# Patient Record
Sex: FEMALE | Race: WHITE | NOT HISPANIC OR LATINO
[De-identification: names, ages, dates, MRNs, and addresses within clinical notes are randomized per-mention and may not be internally consistent; named-entity substitution may affect disease eponyms.]

---

## 2023-08-18 PROBLEM — Z00.00 ENCOUNTER FOR PREVENTIVE HEALTH EXAMINATION: Status: ACTIVE | Noted: 2023-08-18

## 2023-08-21 ENCOUNTER — APPOINTMENT (OUTPATIENT)
Dept: CARDIOLOGY | Facility: CLINIC | Age: 73
End: 2023-08-21
Payer: MEDICAID

## 2023-08-21 VITALS
DIASTOLIC BLOOD PRESSURE: 60 MMHG | HEIGHT: 62.99 IN | BODY MASS INDEX: 24.1 KG/M2 | SYSTOLIC BLOOD PRESSURE: 110 MMHG | WEIGHT: 136 LBS

## 2023-08-21 DIAGNOSIS — Z82.49 FAMILY HISTORY OF ISCHEMIC HEART DISEASE AND OTHER DISEASES OF THE CIRCULATORY SYSTEM: ICD-10-CM

## 2023-08-21 DIAGNOSIS — Z78.9 OTHER SPECIFIED HEALTH STATUS: ICD-10-CM

## 2023-08-21 DIAGNOSIS — R42 DIZZINESS AND GIDDINESS: ICD-10-CM

## 2023-08-21 DIAGNOSIS — R93.1 ABNORMAL FINDINGS ON DIAGNOSTIC IMAGING OF HEART AND CORONARY CIRCULATION: ICD-10-CM

## 2023-08-21 PROCEDURE — 93000 ELECTROCARDIOGRAM COMPLETE: CPT

## 2023-08-21 PROCEDURE — 99204 OFFICE O/P NEW MOD 45 MIN: CPT | Mod: 25

## 2023-08-21 RX ORDER — ROSUVASTATIN CALCIUM 5 MG/1
5 TABLET, FILM COATED ORAL
Refills: 0 | Status: ACTIVE | COMMUNITY

## 2023-08-21 RX ORDER — ASPIRIN 81 MG
81 TABLET, DELAYED RELEASE (ENTERIC COATED) ORAL
Refills: 0 | Status: ACTIVE | COMMUNITY

## 2023-08-21 NOTE — ASSESSMENT
[FreeTextEntry1] : 73-yo with HTN and hyperlipidemia, DM Episodes of chest pain atypical for angina. ? abnormal ECHO in 2022 (? "fluid in the lungs"). Unexplained weight loss, w/u in progress.  Plan: Reduce Atenolol to 12.5 mg daily. Continue Lisinopril. Reduce Rosuvastatin to 5 mg daily. 2D ECHO. F/u after ECHO.  Denis Espinal MD

## 2023-08-21 NOTE — REVIEW OF SYSTEMS
[Chest Discomfort] : chest discomfort [Joint Pain] : joint pain [Dizziness] : dizziness [Negative] : Genitourinary [Blurry Vision] : no blurred vision [Dyspnea on exertion] : not dyspnea during exertion [Lower Ext Edema] : no extremity edema [Leg Claudication] : no intermittent leg claudication [Palpitations] : no palpitations

## 2023-08-21 NOTE — HISTORY OF PRESENT ILLNESS
[FreeTextEntry1] : 73 y.o. female with PMH of DM, HTN, hyperlipidemia presents to establish care. Patient had an episode of chest pain in 12/2022, had TTE and EKG done (as per patient TTE was abnormal, results are not available). No recurrent episodes of chest pain. She complains of low BP (98/51), lightheadedness, presyncope over the last 8 months. Patient lost 12 kg over the last 6 months, follows with her PCP.  Denies SOB, palpitations.   TC 90, LDL 25, HDL 30, trig 175.

## 2023-08-21 NOTE — PHYSICAL EXAM
[Well Developed] : well developed [Well Nourished] : well nourished [No Acute Distress] : no acute distress [Normal Venous Pressure] : normal venous pressure [Normal S1, S2] : normal S1, S2 [No Murmur] : no murmur [Clear Lung Fields] : clear lung fields [Edema ___] : edema [unfilled] [Normal] : moves all extremities, no focal deficits, normal speech [Alert and Oriented] : alert and oriented [S4] : S4

## 2023-08-29 ENCOUNTER — APPOINTMENT (OUTPATIENT)
Dept: CARDIOLOGY | Facility: CLINIC | Age: 73
End: 2023-08-29
Payer: MEDICAID

## 2023-08-29 PROCEDURE — 93306 TTE W/DOPPLER COMPLETE: CPT

## 2023-09-07 ENCOUNTER — APPOINTMENT (OUTPATIENT)
Dept: CARDIOLOGY | Facility: CLINIC | Age: 73
End: 2023-09-07
Payer: MEDICAID

## 2023-09-07 VITALS
DIASTOLIC BLOOD PRESSURE: 60 MMHG | HEIGHT: 62 IN | WEIGHT: 131 LBS | SYSTOLIC BLOOD PRESSURE: 102 MMHG | BODY MASS INDEX: 24.11 KG/M2

## 2023-09-07 DIAGNOSIS — E78.5 HYPERLIPIDEMIA, UNSPECIFIED: ICD-10-CM

## 2023-09-07 DIAGNOSIS — I10 ESSENTIAL (PRIMARY) HYPERTENSION: ICD-10-CM

## 2023-09-07 DIAGNOSIS — R07.89 OTHER CHEST PAIN: ICD-10-CM

## 2023-09-07 PROCEDURE — 93000 ELECTROCARDIOGRAM COMPLETE: CPT

## 2023-09-07 PROCEDURE — 99214 OFFICE O/P EST MOD 30 MIN: CPT | Mod: 25

## 2023-09-07 RX ORDER — METOPROLOL SUCCINATE 25 MG/1
25 TABLET, EXTENDED RELEASE ORAL DAILY
Qty: 45 | Refills: 1 | Status: ACTIVE | COMMUNITY
Start: 2023-09-07 | End: 1900-01-01

## 2023-09-07 RX ORDER — METFORMIN HYDROCHLORIDE 500 MG/1
500 TABLET, COATED ORAL
Refills: 0 | Status: ACTIVE | COMMUNITY

## 2023-09-07 RX ORDER — ATENOLOL 25 MG/1
25 TABLET ORAL DAILY
Refills: 0 | Status: DISCONTINUED | COMMUNITY
End: 2023-09-07

## 2023-09-07 NOTE — HISTORY OF PRESENT ILLNESS
[FreeTextEntry1] : 73 y.o. female with PMH of DM, HTN, hyperlipidemia presents to establish care. Patient had an episode of chest pain in 12/2022, had TTE and EKG done (as per patient TTE was abnormal, results are not available). No recurrent episodes of chest pain. She complains of low BP (98/51), lightheadedness, presyncope over the last 8 months. Patient lost 12 kg over the last 6 months, follows with her PCP.  Denies SOB, palpitations.   TC 90, LDL 25, HDL 30, trig 175.   9/23 Echo - Normal EF, mild TR, no pericardial effusion.

## 2023-09-07 NOTE — ASSESSMENT
[FreeTextEntry1] : 73-yo with HTN and hyperlipidemia, DM Episodes of chest pain atypical for angina, now resolved. Normal LVEF and normal diastolic function. No pericardial effusion.  Unexplained weight loss, w/u in progress.  Plan: Replace Atenolol with Metoprolol 12.5 mg daily. Reduce Lisinopril to 2.5 mg daily. Rosuvastatin reduced to 5 mg daily. F/u in 4 months.  Denis Espinal MD

## 2023-09-07 NOTE — PHYSICAL EXAM
[Well Developed] : well developed [Well Nourished] : well nourished [No Acute Distress] : no acute distress [Normal Venous Pressure] : normal venous pressure [Normal S1, S2] : normal S1, S2 [No Murmur] : no murmur [S4] : S4 [Clear Lung Fields] : clear lung fields [Edema ___] : edema [unfilled] [Normal] : moves all extremities, no focal deficits, normal speech [Alert and Oriented] : alert and oriented

## 2023-10-24 ENCOUNTER — APPOINTMENT (OUTPATIENT)
Dept: ORTHOPEDIC SURGERY | Facility: CLINIC | Age: 73
End: 2023-10-24

## 2024-01-18 ENCOUNTER — APPOINTMENT (OUTPATIENT)
Dept: CARDIOLOGY | Facility: CLINIC | Age: 74
End: 2024-01-18

## 2024-02-12 ENCOUNTER — APPOINTMENT (OUTPATIENT)
Dept: RHEUMATOLOGY | Facility: CLINIC | Age: 74
End: 2024-02-12
Payer: MEDICAID

## 2024-02-12 VITALS
TEMPERATURE: 98 F | DIASTOLIC BLOOD PRESSURE: 84 MMHG | HEIGHT: 62.99 IN | OXYGEN SATURATION: 95 % | WEIGHT: 132 LBS | HEART RATE: 97 BPM | BODY MASS INDEX: 23.39 KG/M2 | SYSTOLIC BLOOD PRESSURE: 140 MMHG

## 2024-02-12 DIAGNOSIS — M79.642 PAIN IN RIGHT HAND: ICD-10-CM

## 2024-02-12 DIAGNOSIS — M06.041 RHEUMATOID ARTHRITIS W/OUT RHEUMATOID FACTOR, RIGHT HAND: ICD-10-CM

## 2024-02-12 DIAGNOSIS — I73.00 RAYNAUD'S SYNDROME W/OUT GANGRENE: ICD-10-CM

## 2024-02-12 DIAGNOSIS — M06.042 RHEUMATOID ARTHRITIS W/OUT RHEUMATOID FACTOR, RIGHT HAND: ICD-10-CM

## 2024-02-12 DIAGNOSIS — M79.641 PAIN IN RIGHT HAND: ICD-10-CM

## 2024-02-12 PROCEDURE — 99204 OFFICE O/P NEW MOD 45 MIN: CPT

## 2024-02-12 RX ORDER — CELECOXIB 200 MG/1
200 CAPSULE ORAL
Qty: 30 | Refills: 1 | Status: ACTIVE | COMMUNITY
Start: 2024-02-12 | End: 1900-01-01

## 2024-02-12 RX ORDER — PANTOPRAZOLE 40 MG/1
40 TABLET, DELAYED RELEASE ORAL
Refills: 0 | Status: ACTIVE | COMMUNITY

## 2024-02-12 RX ORDER — LOPERAMIDE HYDROCHLORIDE 2 MG/1
2 CAPSULE ORAL
Refills: 0 | Status: ACTIVE | COMMUNITY

## 2024-02-12 NOTE — REASON FOR VISIT
[Initial Evaluation] : an initial evaluation [FreeTextEntry1] : b/l hand and face swelling, weight loss, previously diagnosed with RA

## 2024-02-12 NOTE — ASSESSMENT
[FreeTextEntry1] : Joint pain, hand and face swelling: Pt's symptoms are suspicious for possible systemic sclerosis in addition to inflammatory arthritis (which is rarely a manifestation of systemic sclerosis). She had previous labs with negative Scl70 but no other systemic sclerosis labs were performed; also had negative RF but no CCP available. It is possible that she may have an overlap syndrome. With no improvement on methotrexate 15 mg x 4 months.  - Continue methotrexate 15 mg q week for now - Continue folic acid 1 mg q day for now - Start Celebrex 200 mg q day for now - Continue pantoprazole - f/u additional labs for systemic connective tissue diseases, also f/u quantiferon and hep B titers  f/u in 3 weeks to discuss lab results and next steps

## 2024-02-12 NOTE — HISTORY OF PRESENT ILLNESS
[FreeTextEntry1] : From January to August 2023, pt lost 12 kg, and developed severe diffuse pain where she couldn't walk. She went to [facility] and was diagnosed with rheumatoid arthritis. She was prescribed prednisone 20 mg -> 10 mg 1.5 week taper, and was then told to discontinue the prednisone because it can affect her liver? She was then started on methotrexate 15 mg q week, but her symptoms came back on methotrexate off of prednisone. She switched away from Dr. Voss due to insurance change. + Severe gastritis. She has also noticed diffuse finger swelling and swelling in her face, and her fingers turn black with cold. Pt denies dysphagia or rashes.   Physical exam: GEN: Uncomfortable-appearing woman sitting on exam table  FACE: + Periorbital edema PULM: Clear to auscultation b/l CV: Regular rate and rhythm, no murmurs MSK: Neck: Full ROM, + pain with ROM Shoulders: Limited ROM to approx 90 degrees b/l Elbows: + Pain with ROM b/l Wrists: + Effusions with pain on ROM b/l Hands: + Pronounced diffuse edema in all fingers distal to MCPs Hips: Full ROM b/l Knees: + Pain with ROM b/l Ankles: no effusions, full ROM b/l Feet: no effusions, no TTP EXT: + Dilated nailfold capillaries

## 2024-03-19 ENCOUNTER — APPOINTMENT (OUTPATIENT)
Dept: RHEUMATOLOGY | Facility: CLINIC | Age: 74
End: 2024-03-19
Payer: MEDICAID

## 2024-03-19 VITALS
OXYGEN SATURATION: 96 % | DIASTOLIC BLOOD PRESSURE: 72 MMHG | SYSTOLIC BLOOD PRESSURE: 116 MMHG | BODY MASS INDEX: 24.32 KG/M2 | HEIGHT: 62.99 IN | HEART RATE: 80 BPM | WEIGHT: 137.28 LBS

## 2024-03-19 PROCEDURE — 99215 OFFICE O/P EST HI 40 MIN: CPT

## 2024-03-19 NOTE — ASSESSMENT
[FreeTextEntry1] : Systemic sclerosis: With Raynaud's, skin tightening and puffiness noted on hand exam and perioral skin tightening, also dilated nailfold capillaries and GERD symptoms. Pt's labs came back negative for RA, for which she was recently being treated with methotrexate with no improvement in her symptoms. High +RNA hannah III, which is a/w increased risk of scleroderma renal crisis. BP today is 110s/70s. - Discontinue lisinopril as it can increase risk of scleroderma renal crisis in patients with no history of it - Start nifedipine 30 mg q day instead as this can help with Raynaud's symptoms - Start mycophenolate mofetil 500 mg BID, discussed adverse effects - Referred pt for chest CT to evaluate for ILD  Back and knee pain: Pt's symptoms are most suggestive of degenerative changes. She says she previously had imaging of her spine and knees also demonstrating degenerative changes. - Offered pt referral to PT but she declined as she would rather do her own exercises - start duloxetine 30 mg q day, discussed adverse effects  f/u in 6 weeks

## 2024-03-19 NOTE — HISTORY OF PRESENT ILLNESS
[FreeTextEntry1] : Pt comes back today to review her lab results. She continues to experience pain in her back and knees. Also + stiffness in the hands with inability to straighten out her fingers, chronically cold hands and feet, and Raynaud's symptoms with numbness in the hands. + Dyspepsia, pt had EGD which demonstrated gastritis.  Previous testing: - TTE in 2024 - reportedly unremarkable, pt will bring results. Pt had TTE for palpitations - EGD - reportedly gastritis  Previous HPI: From January to August 2023, pt lost 12 kg, and developed severe diffuse pain where she couldn't walk. She went to [facility] and was diagnosed with rheumatoid arthritis. She was prescribed prednisone 20 mg -> 10 mg 1.5 week taper, and was then told to discontinue the prednisone because it can affect her liver? She was then started on methotrexate 15 mg q week, but her symptoms came back on methotrexate off of prednisone. She switched away from Dr. [facility]oss due to insurance change. + Severe gastritis. She has also noticed diffuse finger swelling and swelling in her face, and her fingers turn black with cold. Pt denies dysphagia or rashes.   Physical exam: GEN: Uncomfortable-appearing woman sitting on exam table  FACE: + Periorbital edema PULM: Clear to auscultation b/l CV: Regular rate and rhythm, no murmurs MOUTH: Reduced oral aperture, moist mucous membranes, no oral ulcers MSK: Neck: Full ROM, + pain with ROM Shoulders: Limited ROM to approx 90 degrees b/l Elbows: + Pain with ROM b/l Wrists: + Effusions with pain on ROM b/l Hands: + Pronounced diffuse edema in all fingers distal to MCPs Hips: Full ROM b/l Knees: + Pain with ROM b/l Ankles: no effusions, full ROM b/l Feet: no effusions, no TTP EXT: + Dilated nailfold capillaries

## 2024-04-23 ENCOUNTER — NON-APPOINTMENT (OUTPATIENT)
Age: 74
End: 2024-04-23

## 2024-05-16 ENCOUNTER — APPOINTMENT (OUTPATIENT)
Dept: RHEUMATOLOGY | Facility: CLINIC | Age: 74
End: 2024-05-16
Payer: MEDICAID

## 2024-05-16 VITALS
OXYGEN SATURATION: 88 % | TEMPERATURE: 97.9 F | HEIGHT: 62 IN | SYSTOLIC BLOOD PRESSURE: 114 MMHG | DIASTOLIC BLOOD PRESSURE: 71 MMHG | BODY MASS INDEX: 25.4 KG/M2 | WEIGHT: 138 LBS | HEART RATE: 146 BPM

## 2024-05-16 DIAGNOSIS — Z86.39 PERSONAL HISTORY OF OTHER ENDOCRINE, NUTRITIONAL AND METABOLIC DISEASE: ICD-10-CM

## 2024-05-16 DIAGNOSIS — Z87.19 PERSONAL HISTORY OF OTHER DISEASES OF THE DIGESTIVE SYSTEM: ICD-10-CM

## 2024-05-16 DIAGNOSIS — Z87.39 PERSONAL HISTORY OF OTHER DISEASES OF THE MUSCULOSKELETAL SYSTEM AND CONNECTIVE TISSUE: ICD-10-CM

## 2024-05-16 DIAGNOSIS — Z86.79 PERSONAL HISTORY OF OTHER DISEASES OF THE CIRCULATORY SYSTEM: ICD-10-CM

## 2024-05-16 PROCEDURE — 99215 OFFICE O/P EST HI 40 MIN: CPT

## 2024-05-16 RX ORDER — METHOTREXATE 2.5 MG/1
2.5 TABLET ORAL
Qty: 73 | Refills: 1 | Status: DISCONTINUED | COMMUNITY
Start: 2024-02-12 | End: 2024-05-16

## 2024-05-16 RX ORDER — LISINOPRIL 2.5 MG/1
2.5 TABLET ORAL
Qty: 90 | Refills: 1 | Status: DISCONTINUED | COMMUNITY
End: 2024-05-16

## 2024-05-16 RX ORDER — ARTIFICIAL TEARS 1; 2; 3 MG/ML; MG/ML; MG/ML
SOLUTION/ DROPS OPHTHALMIC
Refills: 0 | Status: ACTIVE | COMMUNITY

## 2024-05-16 RX ORDER — NIFEDIPINE 30 MG/1
30 TABLET, EXTENDED RELEASE ORAL
Qty: 30 | Refills: 3 | Status: DISCONTINUED | COMMUNITY
Start: 2024-03-19 | End: 2024-05-16

## 2024-05-16 RX ORDER — GABAPENTIN 100 MG/1
100 CAPSULE ORAL
Qty: 30 | Refills: 2 | Status: ACTIVE | COMMUNITY
Start: 2024-05-16 | End: 1900-01-01

## 2024-05-16 NOTE — HISTORY OF PRESENT ILLNESS
[FreeTextEntry1] : After her last visit, pt tried taking duloxetine but developed severe nausea so she had to discontinue it. She also tried taking nifedipine for the Raynaud's but she developed an allergic reaction, where her face became red and swollen, so she had to stop taking it. She has been taking the mycophenolate mofetil without any side effects. + Continued severe pain in the wrists, legs, with pain in the back and legs with walking. + Also SOB with walking. Pt denies choking when she eats.  Previous testing: - TTE in 8/2023 with borderline pulmonary hypertension PASP 29, no pericardial effusion, EF 61% - EGD - reportedly gastritis, GI doctor no longer takes pt's insurance  Previous HPI: From January to August 2023, pt lost 12 kg, and developed severe diffuse pain where she couldn't walk. She went to [facility] and was diagnosed with rheumatoid arthritis. She was prescribed prednisone 20 mg -> 10 mg 1.5 week taper, and was then told to discontinue the prednisone because it can affect her liver? She was then started on methotrexate 15 mg q week, but her symptoms came back on methotrexate off of prednisone. She switched away from Dr. Voss due to insurance change. + Severe gastritis. She has also noticed diffuse finger swelling and swelling in her face, and her fingers turn black with cold. Pt denies dysphagia or rashes.   Physical exam: GEN: Uncomfortable-appearing woman sitting on exam table  FACE: + Periorbital edema PULM: Clear to auscultation b/l CV: Regular rate and rhythm, no murmurs MOUTH: Reduced oral aperture, moist mucous membranes, no oral ulcers MSK: Neck: Full ROM, + pain with ROM Shoulders: Limited ROM to approx 90 degrees b/l Elbows: + Pain with ROM b/l Wrists: + Effusions with pain on ROM b/l Hands: + Pronounced diffuse edema in all fingers distal to MCPs with limited extension at PIPs Hips: Full ROM b/l Knees: + Pain with ROM b/l Ankles: no effusions, full ROM b/l Feet: no effusions, no TTP EXT: + Dilated nailfold capillaries

## 2024-05-16 NOTE — ASSESSMENT
[FreeTextEntry1] : Abnormal CT chest: Pt had a CT chest to evaluate for ILD in April 2024, which demonstrated bronchiectasis with findings concerning for possible recurrent aspiration, likely related to her systemic sclerosis. Also with multiple pulmonary nodules. The CT scan was indeterminate for ILD. - Referred to pulmonology clinic for follow-up for PFTs and further evaluation of findings concerning for aspiration  - Referred to GI clinic for evaluation for esophageal dysmotility given findings on CT chest - Pt is due for repeat CT chest ~July-August 2024    Systemic sclerosis: With Raynaud's, skin tightening and puffiness noted on hand exam and perioral skin tightening, dilated nailfold capillaries, GERD symptoms, also likely esophageal dysmotility given the CT chest findings mentioned above. Pt's labs came back negative for RA, for which she was recently being treated with methotrexate with no improvement in her symptoms. High +RNA hannah III, which is a/w increased risk of scleroderma renal crisis. BP today is stable at 114/71. - Discontinue lisinopril again, as it can increase risk of scleroderma renal crisis in patients with no history of it - Start amlodipine 5 mg q day - f/u CBC and CMP. If CBC and CMP are wnl, will plan to increase mycophenolate mofetil to 1000 mg qAM and 500 mg q PM - Referred to pulm and GI clinic as above  Back and knee pain: Pt's symptoms are most suggestive of degenerative changes. She says she previously had imaging of her spine and knees also demonstrating degenerative changes. Pt was unable to tolerate duloxetine due to GI side effects - Previously offered pt referral to PT but she declined as she would rather do her own exercises - Start gabapentin 100 mg qhs  f/u in 6 weeks, will call pt with lab results

## 2024-05-17 ENCOUNTER — APPOINTMENT (OUTPATIENT)
Dept: PULMONOLOGY | Facility: CLINIC | Age: 74
End: 2024-05-17
Payer: MEDICAID

## 2024-05-17 ENCOUNTER — OUTPATIENT (OUTPATIENT)
Dept: OUTPATIENT SERVICES | Facility: HOSPITAL | Age: 74
LOS: 1 days | End: 2024-05-17
Payer: MEDICAID

## 2024-05-17 VITALS
DIASTOLIC BLOOD PRESSURE: 76 MMHG | HEART RATE: 78 BPM | TEMPERATURE: 97.6 F | OXYGEN SATURATION: 98 % | HEIGHT: 62 IN | SYSTOLIC BLOOD PRESSURE: 120 MMHG | BODY MASS INDEX: 25.58 KG/M2 | WEIGHT: 139 LBS

## 2024-05-17 DIAGNOSIS — J84.9 INTERSTITIAL PULMONARY DISEASE, UNSPECIFIED: ICD-10-CM

## 2024-05-17 DIAGNOSIS — Z00.00 ENCOUNTER FOR GENERAL ADULT MEDICAL EXAMINATION WITHOUT ABNORMAL FINDINGS: ICD-10-CM

## 2024-05-17 DIAGNOSIS — R06.02 SHORTNESS OF BREATH: ICD-10-CM

## 2024-05-17 PROCEDURE — 99204 OFFICE O/P NEW MOD 45 MIN: CPT

## 2024-05-17 NOTE — PHYSICAL EXAM
[No Acute Distress] : no acute distress [Normal Oropharynx] : normal oropharynx [Normal Appearance] : normal appearance [Normal Rate/Rhythm] : normal rate/rhythm [Normal S1, S2] : normal s1, s2 [No Resp Distress] : no resp distress [No Abnormalities] : no abnormalities [Benign] : benign [Normal Gait] : normal gait [No Edema] : no edema [No Focal Deficits] : no focal deficits [TextBox_105] : swollen fingers and thight skin

## 2024-05-17 NOTE — HISTORY OF PRESENT ILLNESS
[Never] : never [TextBox_4] : This is the case of a 75 yo female patient newly diagnosed with systemic sclerosis  started of MMF presenting for pulmonary evaluation .  Patient reports shortness of breath on minimal exertion . Started recently on MMF .

## 2024-05-17 NOTE — ASSESSMENT
[FreeTextEntry1] : This is the case of a 73 yo female patient newly diagnosed with systemic sclerosis  started of MMF presenting for pulmonary evaluation .  Patient reports shortness of breath on minimal exertion . Started recently on MMF .    # dyspnea -CT scna reviewed  -no honeycoombing  -Found multiple  pulmonary nodules  -Will send for cardiology and echo cardio for further evaluation of dyspnea  -PFT ordered  #latent TB  Quantiferon positive  will send bher on isoniazid sent to pharmacy  CMP and hepatic function panel in 2 weeks  advised to come back in one month    #multiple nodules on CT  Will send for PET    RTC in one month

## 2024-05-17 NOTE — END OF VISIT
[] : Resident [FreeTextEntry3] : Lung nodules - multiple - peripheral - PET ordered  PFTs  CT reviewed - no clear ILD  Usually NSIP is associated with SS  Will also repeat Echo to rule out pulm htn  O2 sat normal with ambulation  Quantiferon positive  Start INH - LFTs normal previously  Labs every month and then symptom driven  1 month follow up

## 2024-05-17 NOTE — REVIEW OF SYSTEMS
[Fatigue] : fatigue [Recent Wt Loss (___ Lbs)] : ~T recent [unfilled] lb weight loss [Negative] : Neurologic

## 2024-05-22 ENCOUNTER — NON-APPOINTMENT (OUTPATIENT)
Age: 74
End: 2024-05-22

## 2024-05-23 DIAGNOSIS — J84.9 INTERSTITIAL PULMONARY DISEASE, UNSPECIFIED: ICD-10-CM

## 2024-05-23 DIAGNOSIS — R06.02 SHORTNESS OF BREATH: ICD-10-CM

## 2024-05-23 DIAGNOSIS — R91.8 OTHER NONSPECIFIC ABNORMAL FINDING OF LUNG FIELD: ICD-10-CM

## 2024-05-23 DIAGNOSIS — Z22.7 LATENT TUBERCULOSIS: ICD-10-CM

## 2024-05-29 ENCOUNTER — APPOINTMENT (OUTPATIENT)
Dept: CV DIAGNOSITCS | Facility: HOSPITAL | Age: 74
End: 2024-05-29
Payer: MEDICAID

## 2024-05-29 ENCOUNTER — RESULT REVIEW (OUTPATIENT)
Age: 74
End: 2024-05-29

## 2024-05-29 ENCOUNTER — OUTPATIENT (OUTPATIENT)
Dept: OUTPATIENT SERVICES | Facility: HOSPITAL | Age: 74
LOS: 1 days | End: 2024-05-29
Payer: MEDICAID

## 2024-05-29 DIAGNOSIS — M34.9 SYSTEMIC SCLEROSIS, UNSPECIFIED: ICD-10-CM

## 2024-05-29 PROCEDURE — 93306 TTE W/DOPPLER COMPLETE: CPT

## 2024-05-29 PROCEDURE — 93306 TTE W/DOPPLER COMPLETE: CPT | Mod: 26

## 2024-05-30 DIAGNOSIS — M34.9 SYSTEMIC SCLEROSIS, UNSPECIFIED: ICD-10-CM

## 2024-05-31 ENCOUNTER — LABORATORY RESULT (OUTPATIENT)
Age: 74
End: 2024-05-31

## 2024-05-31 ENCOUNTER — OUTPATIENT (OUTPATIENT)
Dept: OUTPATIENT SERVICES | Facility: HOSPITAL | Age: 74
LOS: 1 days | End: 2024-05-31
Payer: MEDICAID

## 2024-05-31 PROCEDURE — 80053 COMPREHEN METABOLIC PANEL: CPT

## 2024-05-31 PROCEDURE — 82248 BILIRUBIN DIRECT: CPT

## 2024-06-03 ENCOUNTER — OUTPATIENT (OUTPATIENT)
Dept: OUTPATIENT SERVICES | Facility: HOSPITAL | Age: 74
LOS: 1 days | End: 2024-06-03
Payer: MEDICAID

## 2024-06-03 DIAGNOSIS — Z00.8 ENCOUNTER FOR OTHER GENERAL EXAMINATION: ICD-10-CM

## 2024-06-03 DIAGNOSIS — R91.8 OTHER NONSPECIFIC ABNORMAL FINDING OF LUNG FIELD: ICD-10-CM

## 2024-06-03 LAB
ALBUMIN SERPL ELPH-MCNC: 4.6 G/DL
ALP BLD-CCNC: 133 U/L
ALT SERPL-CCNC: 10 U/L
ANION GAP SERPL CALC-SCNC: 16 MMOL/L
AST SERPL-CCNC: 25 U/L
BILIRUB SERPL-MCNC: 0.3 MG/DL
BUN SERPL-MCNC: 23 MG/DL
CALCIUM SERPL-MCNC: 10 MG/DL
CHLORIDE SERPL-SCNC: 101 MMOL/L
CO2 SERPL-SCNC: 24 MMOL/L
CREAT SERPL-MCNC: 0.7 MG/DL
EGFR: 90 ML/MIN/1.73M2
GLUCOSE BLDC GLUCOMTR-MCNC: 78 MG/DL — SIGNIFICANT CHANGE UP (ref 70–99)
GLUCOSE SERPL-MCNC: 89 MG/DL
POTASSIUM SERPL-SCNC: 4.8 MMOL/L
PROT SERPL-MCNC: 7 G/DL
SODIUM SERPL-SCNC: 141 MMOL/L

## 2024-06-03 PROCEDURE — A9552: CPT

## 2024-06-03 PROCEDURE — 82962 GLUCOSE BLOOD TEST: CPT

## 2024-06-03 PROCEDURE — 78815 PET IMAGE W/CT SKULL-THIGH: CPT | Mod: 26,PI

## 2024-06-03 PROCEDURE — 78815 PET IMAGE W/CT SKULL-THIGH: CPT | Mod: PI

## 2024-06-04 DIAGNOSIS — R91.8 OTHER NONSPECIFIC ABNORMAL FINDING OF LUNG FIELD: ICD-10-CM

## 2024-06-04 LAB
ALBUMIN SERPL ELPH-MCNC: 4.4 G/DL
ALP BLD-CCNC: 122 U/L
ALT SERPL-CCNC: 10 U/L
ANION GAP SERPL CALC-SCNC: 13 MMOL/L
APPEARANCE: CLEAR
AST SERPL-CCNC: 25 U/L
BACTERIA: NEGATIVE /HPF
BASOPHILS # BLD AUTO: 0.02 K/UL
BASOPHILS NFR BLD AUTO: 0.3 %
BILIRUB SERPL-MCNC: 0.4 MG/DL
BILIRUBIN URINE: NEGATIVE
BLOOD URINE: NEGATIVE
BUN SERPL-MCNC: 18 MG/DL
CALCIUM OXALATE CRYSTALS: PRESENT
CALCIUM SERPL-MCNC: 9.7 MG/DL
CAST: 0 /LPF
CHLORIDE SERPL-SCNC: 105 MMOL/L
CO2 SERPL-SCNC: 24 MMOL/L
COLOR: YELLOW
CREAT SERPL-MCNC: 0.7 MG/DL
CREAT SPEC-SCNC: 129 MG/DL
CREAT/PROT UR: 0.1 RATIO
CRP SERPL-MCNC: <3 MG/L
EGFR: 90 ML/MIN/1.73M2
EOSINOPHIL # BLD AUTO: 0.07 K/UL
EOSINOPHIL NFR BLD AUTO: 1.1 %
EPITHELIAL CELLS: 6 /HPF
ERYTHROCYTE [SEDIMENTATION RATE] IN BLOOD BY WESTERGREN METHOD: 10 MM/HR
GLUCOSE QUALITATIVE U: NEGATIVE MG/DL
GLUCOSE SERPL-MCNC: 115 MG/DL
HCT VFR BLD CALC: 41.3 %
HGB BLD-MCNC: 13.3 G/DL
IMM GRANULOCYTES NFR BLD AUTO: 0.3 %
KETONES URINE: NEGATIVE MG/DL
LEUKOCYTE ESTERASE URINE: ABNORMAL
LYMPHOCYTES # BLD AUTO: 1.31 K/UL
LYMPHOCYTES NFR BLD AUTO: 21.5 %
MAN DIFF?: NORMAL
MCHC RBC-ENTMCNC: 29 PG
MCHC RBC-ENTMCNC: 32.2 G/DL
MCV RBC AUTO: 90.2 FL
MICROSCOPIC-UA: NORMAL
MONOCYTES # BLD AUTO: 0.54 K/UL
MONOCYTES NFR BLD AUTO: 8.9 %
NEUTROPHILS # BLD AUTO: 4.14 K/UL
NEUTROPHILS NFR BLD AUTO: 67.9 %
NITRITE URINE: NEGATIVE
PH URINE: 5.5
PLATELET # BLD AUTO: 248 K/UL
PMV BLD AUTO: 0 /100 WBCS
POTASSIUM SERPL-SCNC: 4.5 MMOL/L
PROT SERPL-MCNC: 6.8 G/DL
PROT UR-MCNC: 12 MG/DLG/24H
PROTEIN URINE: NEGATIVE MG/DL
RBC # BLD: 4.58 M/UL
RBC # FLD: 14.2 %
RED BLOOD CELLS URINE: 3 /HPF
REVIEW: NORMAL
SODIUM SERPL-SCNC: 142 MMOL/L
SPECIFIC GRAVITY URINE: 1.02
UROBILINOGEN URINE: 0.2 MG/DL
WBC # FLD AUTO: 6.1 K/UL
WHITE BLOOD CELLS URINE: 1 /HPF

## 2024-06-04 RX ORDER — AMLODIPINE BESYLATE 5 MG/1
5 TABLET ORAL DAILY
Qty: 30 | Refills: 2 | Status: DISCONTINUED | COMMUNITY
Start: 2024-05-16 | End: 2024-06-04

## 2024-06-04 RX ORDER — SILDENAFIL 20 MG/1
20 TABLET ORAL
Qty: 30 | Refills: 2 | Status: ACTIVE | COMMUNITY
Start: 2024-06-04 | End: 1900-01-01

## 2024-06-04 RX ORDER — MYCOPHENOLATE MOFETIL 500 MG/1
500 TABLET ORAL
Qty: 90 | Refills: 2 | Status: ACTIVE | COMMUNITY
Start: 2024-06-04 | End: 1900-01-01

## 2024-06-04 RX ORDER — NIFEDIPINE 30 MG/1
30 TABLET, EXTENDED RELEASE ORAL DAILY
Qty: 90 | Refills: 1 | Status: DISCONTINUED | COMMUNITY
Start: 2024-06-04 | End: 2024-06-04

## 2024-06-04 RX ORDER — MYCOPHENOLATE MOFETIL 500 MG/1
500 TABLET ORAL
Qty: 60 | Refills: 3 | Status: DISCONTINUED | COMMUNITY
Start: 2024-03-19 | End: 2024-06-04

## 2024-06-21 ENCOUNTER — APPOINTMENT (OUTPATIENT)
Dept: PULMONOLOGY | Facility: CLINIC | Age: 74
End: 2024-06-21
Payer: MEDICAID

## 2024-06-21 ENCOUNTER — OUTPATIENT (OUTPATIENT)
Dept: OUTPATIENT SERVICES | Facility: HOSPITAL | Age: 74
LOS: 1 days | End: 2024-06-21
Payer: MEDICAID

## 2024-06-21 VITALS
DIASTOLIC BLOOD PRESSURE: 80 MMHG | HEIGHT: 62 IN | HEART RATE: 70 BPM | BODY MASS INDEX: 25.95 KG/M2 | SYSTOLIC BLOOD PRESSURE: 152 MMHG | TEMPERATURE: 97.8 F | WEIGHT: 141 LBS

## 2024-06-21 VITALS — SYSTOLIC BLOOD PRESSURE: 144 MMHG | DIASTOLIC BLOOD PRESSURE: 79 MMHG

## 2024-06-21 DIAGNOSIS — M34.9 SYSTEMIC SCLEROSIS, UNSPECIFIED: ICD-10-CM

## 2024-06-21 DIAGNOSIS — Z22.7 LATENT TUBERCULOSIS: ICD-10-CM

## 2024-06-21 DIAGNOSIS — R91.8 OTHER NONSPECIFIC ABNORMAL FINDING OF LUNG FIELD: ICD-10-CM

## 2024-06-21 DIAGNOSIS — Z00.00 ENCOUNTER FOR GENERAL ADULT MEDICAL EXAMINATION WITHOUT ABNORMAL FINDINGS: ICD-10-CM

## 2024-06-21 PROCEDURE — 99214 OFFICE O/P EST MOD 30 MIN: CPT

## 2024-06-21 PROCEDURE — G2211 COMPLEX E/M VISIT ADD ON: CPT | Mod: NC,1L

## 2024-06-21 RX ORDER — ISONIAZID 300 MG/1
300 TABLET ORAL DAILY
Qty: 30 | Refills: 3 | Status: ACTIVE | COMMUNITY
Start: 2024-05-17 | End: 1900-01-01

## 2024-06-21 NOTE — PHYSICAL EXAM
[No Acute Distress] : no acute distress [Normal Oropharynx] : normal oropharynx [Normal Appearance] : normal appearance [No Neck Mass] : no neck mass [Normal Rate/Rhythm] : normal rate/rhythm [Normal S1, S2] : normal s1, s2 [No Murmurs] : no murmurs [No Resp Distress] : no resp distress [Clear to Auscultation Bilaterally] : clear to auscultation bilaterally [No Abnormalities] : no abnormalities [Benign] : benign [No Clubbing] : no clubbing [No Edema] : no edema [Normal Color/ Pigmentation] : normal color/ pigmentation [No Focal Deficits] : no focal deficits [Oriented x3] : oriented x3 [Normal Affect] : normal affect

## 2024-06-21 NOTE — REVIEW OF SYSTEMS
[Fatigue] : fatigue [Negative] : Gastrointestinal [Fever] : no fever [Chills] : no chills [Dry Eyes] : no dry eyes [Epistaxis] : no epistaxis [Sore Throat] : no sore throat [Eye Irritation] : no eye irritation [Nasal Congestion] : no nasal congestion [Postnasal Drip] : no postnasal drip [Dry Mouth] : no dry mouth [Sinus Problems] : no sinus problems [Poor Dentition] : no poor dentition

## 2024-06-21 NOTE — END OF VISIT
[] : Resident [FreeTextEntry3] : PET reviewed  Non specific findings LLL nodule measuring 1cm it was 9mm in April   Suggested EBUs for the lymph nodes  Likely reactive  Patient reluctant  No clear evidence of ILD  On MMF for SS and following with rheumatology  CT in July  Latent TB on INH - LFTs every month for now  2 months follow up

## 2024-06-21 NOTE — ASSESSMENT
[FreeTextEntry1] : This is the case of a 75 yo female patient newly diagnosed with systemic sclerosis started of MMF presenting for follow up.   used, explained all her lab findings, answered all her questions, offered ebus, refused and preferred following w/ imaging, instructed to see her PCP for thyroid nodule   # dyspnea RESOLVED  -CT scan 2024 no honeycombing, found multiple pulmonary nodules -echo cardio 2024  for further evaluation of dyspnea: EF59% , no major valvopathies  -PFT ordered, appointment June 27   #latent TB -QuantiFERON positive - on isoniazid sent to pharmacy w/ refills for 3 more months total of 9  -CMP [id number] in June was 133 in may  -sent 2 cmps to be done 2 months difference   #multiple nodules on CT -PET 2024: Abnormal FDG uptake co-registering with few mediastinal lymph nodes (SUV up to 4.3, right paratracheal lymph node) and bilateral hilar FDG uptake (SUV up to 9.6). Abnormal FDG uptake co-registering with a left lower lobe 1.0 cm subpleural nodule (SUV 4.4) and left apical lung 5 mm subpleural nodule (SUV 2.2, positive on nonattenuation corrected images) Focal FDG uptake within the thyroid gland (SUV 9.0). Correlation with sonogram may be obtained. No other definite sites of abnormal FDG uptake to suggest biologic tumor activity. Few additional bilateral pulmonary nodules without definite abnormal FDG uptake on attenuation and nonattenuation corrected images -follow ct scan w/ iv contrast chest in july

## 2024-06-21 NOTE — HISTORY OF PRESENT ILLNESS
[Never] : never [TextBox_4] : This is the case of a 75 yo female patient newly diagnosed with systemic sclerosis started of MMF presenting for follow up.  none smoker does not have dyspnea or SOB only fatigue one exertion   used [id number]

## 2024-06-25 ENCOUNTER — NON-APPOINTMENT (OUTPATIENT)
Age: 74
End: 2024-06-25

## 2024-06-25 DIAGNOSIS — R91.8 OTHER NONSPECIFIC ABNORMAL FINDING OF LUNG FIELD: ICD-10-CM

## 2024-06-25 DIAGNOSIS — M34.9 SYSTEMIC SCLEROSIS, UNSPECIFIED: ICD-10-CM

## 2024-06-25 DIAGNOSIS — Z22.7 LATENT TUBERCULOSIS: ICD-10-CM

## 2024-06-27 ENCOUNTER — OUTPATIENT (OUTPATIENT)
Dept: OUTPATIENT SERVICES | Facility: HOSPITAL | Age: 74
LOS: 1 days | End: 2024-06-27

## 2024-06-27 ENCOUNTER — OUTPATIENT (OUTPATIENT)
Dept: OUTPATIENT SERVICES | Facility: HOSPITAL | Age: 74
LOS: 1 days | End: 2024-06-27
Payer: MEDICAID

## 2024-06-27 ENCOUNTER — APPOINTMENT (OUTPATIENT)
Dept: PULMONOLOGY | Facility: HOSPITAL | Age: 74
End: 2024-06-27

## 2024-06-27 DIAGNOSIS — Z22.7 LATENT TUBERCULOSIS: ICD-10-CM

## 2024-06-27 DIAGNOSIS — R06.02 SHORTNESS OF BREATH: ICD-10-CM

## 2024-06-27 DIAGNOSIS — Z00.00 ENCOUNTER FOR GENERAL ADULT MEDICAL EXAMINATION WITHOUT ABNORMAL FINDINGS: ICD-10-CM

## 2024-06-27 PROCEDURE — 94010 BREATHING CAPACITY TEST: CPT

## 2024-06-28 DIAGNOSIS — Z22.7 LATENT TUBERCULOSIS: ICD-10-CM

## 2024-06-28 DIAGNOSIS — R06.02 SHORTNESS OF BREATH: ICD-10-CM

## 2024-07-01 LAB
ALBUMIN SERPL ELPH-MCNC: 4.5 G/DL
ALP BLD-CCNC: 105 U/L
ALT SERPL-CCNC: 6 U/L
ANION GAP SERPL CALC-SCNC: 9 MMOL/L
AST SERPL-CCNC: 17 U/L
BILIRUB SERPL-MCNC: 0.4 MG/DL
BUN SERPL-MCNC: 13 MG/DL
CALCIUM SERPL-MCNC: 9.7 MG/DL
CHLORIDE SERPL-SCNC: 108 MMOL/L
CO2 SERPL-SCNC: 27 MMOL/L
CREAT SERPL-MCNC: 0.7 MG/DL
EGFR: 90 ML/MIN/1.73M2
GLUCOSE SERPL-MCNC: 96 MG/DL
POTASSIUM SERPL-SCNC: 5.1 MMOL/L
PROT SERPL-MCNC: 6.8 G/DL
SODIUM SERPL-SCNC: 144 MMOL/L

## 2024-07-02 ENCOUNTER — APPOINTMENT (OUTPATIENT)
Dept: GASTROENTEROLOGY | Facility: CLINIC | Age: 74
End: 2024-07-02

## 2024-07-15 DIAGNOSIS — Z00.00 ENCOUNTER FOR GENERAL ADULT MEDICAL EXAMINATION WITHOUT ABNORMAL FINDINGS: ICD-10-CM

## 2024-07-25 ENCOUNTER — APPOINTMENT (OUTPATIENT)
Dept: RHEUMATOLOGY | Facility: CLINIC | Age: 74
End: 2024-07-25
Payer: MEDICAID

## 2024-07-25 VITALS
SYSTOLIC BLOOD PRESSURE: 142 MMHG | HEIGHT: 62 IN | DIASTOLIC BLOOD PRESSURE: 81 MMHG | TEMPERATURE: 97.8 F | WEIGHT: 133 LBS | BODY MASS INDEX: 24.48 KG/M2 | OXYGEN SATURATION: 89 % | HEART RATE: 79 BPM

## 2024-07-25 DIAGNOSIS — Z00.00 ENCOUNTER FOR GENERAL ADULT MEDICAL EXAMINATION W/OUT ABNORMAL FINDINGS: ICD-10-CM

## 2024-07-25 DIAGNOSIS — R10.9 UNSPECIFIED ABDOMINAL PAIN: ICD-10-CM

## 2024-07-25 DIAGNOSIS — M34.9 SYSTEMIC SCLEROSIS, UNSPECIFIED: ICD-10-CM

## 2024-07-25 DIAGNOSIS — N28.1 CYST OF KIDNEY, ACQUIRED: ICD-10-CM

## 2024-07-25 DIAGNOSIS — J84.9 INTERSTITIAL PULMONARY DISEASE, UNSPECIFIED: ICD-10-CM

## 2024-07-25 DIAGNOSIS — E78.5 HYPERLIPIDEMIA, UNSPECIFIED: ICD-10-CM

## 2024-07-25 DIAGNOSIS — E04.1 NONTOXIC SINGLE THYROID NODULE: ICD-10-CM

## 2024-07-25 PROCEDURE — 99215 OFFICE O/P EST HI 40 MIN: CPT

## 2024-07-25 RX ORDER — SILDENAFIL 20 MG/1
20 TABLET ORAL
Qty: 60 | Refills: 2 | Status: ACTIVE | COMMUNITY
Start: 2024-07-25 | End: 1900-01-01

## 2024-07-25 RX ORDER — MYCOPHENOLATE MOFETIL 500 MG/1
500 TABLET ORAL TWICE DAILY
Qty: 360 | Refills: 1 | Status: ACTIVE | COMMUNITY
Start: 2024-07-25 | End: 1900-01-01

## 2024-07-25 NOTE — ASSESSMENT
[FreeTextEntry1] : Abnormal CT chest: Pt had a CT chest to evaluate for ILD in April 2024, which demonstrated bronchiectasis with findings concerning for possible recurrent aspiration, likely related to her systemic sclerosis. Also with multiple pulmonary nodules. The CT scan was indeterminate for ILD. - Per pulm pt's findings concerning for aspiration are relatively mild.  - Referred to GI clinic for evaluation for esophageal dysmotility given findings on CT chest - Pt is due for repeat CT chest ~July-August 2024    Systemic sclerosis: With Raynaud's, skin tightening and puffiness noted on hand exam and perioral skin tightening, dilated nailfold capillaries, GERD symptoms, also likely esophageal dysmotility given the CT chest findings mentioned above. Pt's labs came back negative for RA, for which she was recently being treated with methotrexate with no improvement in her symptoms. High +RNA hannah III, which is a/w increased risk of scleroderma renal crisis. BP today is stable at 114/71. - Increase sildnafil to 20 mg BID  - Increase mycophenolate mofetil to 1000 mg BID, pt had CBC and CMP in 6/2024, f/u repeat in 8/2024 - Pt was referred for CT chest with IV contrast by pulm, also PFTs - Pt is also being treated for latent TB by pulm as her quantiferon was positive  RUQ pain:  - f/u abdominal ultrasound  Thyroid nodules: Pt had abnormal FDG uptake focally in her thyroid on her 2024 PET CT - f/u thyroid ultrasound  Back and knee pain: Pt's symptoms are most suggestive of degenerative changes. She says she previously had imaging of her spine and knees also demonstrating degenerative changes. Pt was unable to tolerate duloxetine due to GI side effects - Previously offered pt referral to PT but she declined as she would rather do her own exercises - Start gabapentin 100 mg qhs, pt is amenable to trying it, I advised her that I would not expect it to cause hepatotoxicity or exacerbate dyspepsia  f/u in 2 months, will call pt with lab and US results

## 2024-07-25 NOTE — HISTORY OF PRESENT ILLNESS
[FreeTextEntry1] : Pt continues to experience pain in her low back radiating to her L leg, her knees, shoulders. Also + some tingling in her hands and pt says she was previously told she has carpal tunnel syndrome, but the Raynaud's symptoms are more bothersome for her. After her last appointment she developed facial flushing again on amlodipine so her medication for Raynaud's was switched to sildenafil, which has not caused side effects but she is not sure if it is making a difference so far because her symptoms are worse in the winter. She never took the gabapentin because she was concerned about possible stomach or hepatic side effects. + Continued facial flushing in spite of stopping the amlodipine; pt saw dermatology and was told she has rosacea, was given a cream which is slowly helping.  Previous treatments for systemic sclerosis: - Nifedipine - discontinued due to facial flushing - Amlodipine - discontinued due to facial flushing - Currently takes sildenafil - Methotrexate - was started when pt was previously diagnosed with RA, didn't help - Currently taking mycophenolate mofetil  Previous testing: - TTE in 8/2023 with borderline pulmonary hypertension PASP 29, no pericardial effusion, EF 61% - EGD - Gastritis - CT chest with pulmonary nodules. Pt saw Dr. Magen Wagner, had PET scan which demonstrated in b/l hilar and a few mediastinal lymph nodes, also had focal uptake within the thyroid gland. Pt is going for CT chest with IV contrast, PFTs  Previous HPI: From January to August 2023, pt lost 12 kg, and developed severe diffuse pain where she couldn't walk. She went to [facility] and was diagnosed with rheumatoid arthritis. She was prescribed prednisone 20 mg -> 10 mg 1.5 week taper, and was then told to discontinue the prednisone because it can affect her liver? She was then started on methotrexate 15 mg q week, but her symptoms came back on methotrexate off of prednisone. She switched away from Dr. Voss due to insurance change. + Severe gastritis. She has also noticed diffuse finger swelling and swelling in her face, and her fingers turn black with cold. Pt denies dysphagia or rashes.   Physical exam: GEN: Uncomfortable-appearing woman sitting on exam table  FACE: + Periorbital edema PULM: Clear to auscultation b/l CV: Regular rate and rhythm, no murmurs MOUTH: Reduced oral aperture, moist mucous membranes, no oral ulcers MSK: Neck: Full ROM, + pain with ROM Shoulders: Limited ROM to approx 90 degrees b/l Elbows: + Pain with ROM b/l Wrists: + Effusions with pain on ROM b/l, - Tinel Hands: + Pronounced diffuse edema in all fingers distal to MCPs with limited extension at PIPs Hips: Full ROM b/l Knees: + Pain with ROM b/l Ankles: no effusions, full ROM b/l Feet: no effusions, no TTP EXT: + Dilated nailfold capillaries

## 2024-08-08 ENCOUNTER — RESULT REVIEW (OUTPATIENT)
Age: 74
End: 2024-08-08

## 2024-08-08 ENCOUNTER — OUTPATIENT (OUTPATIENT)
Dept: OUTPATIENT SERVICES | Facility: HOSPITAL | Age: 74
LOS: 1 days | End: 2024-08-08
Payer: MEDICAID

## 2024-08-08 DIAGNOSIS — N28.1 CYST OF KIDNEY, ACQUIRED: ICD-10-CM

## 2024-08-08 DIAGNOSIS — R10.9 UNSPECIFIED ABDOMINAL PAIN: ICD-10-CM

## 2024-08-08 DIAGNOSIS — Z00.8 ENCOUNTER FOR OTHER GENERAL EXAMINATION: ICD-10-CM

## 2024-08-08 PROCEDURE — 76536 US EXAM OF HEAD AND NECK: CPT | Mod: 26

## 2024-08-08 PROCEDURE — 76700 US EXAM ABDOM COMPLETE: CPT | Mod: 26

## 2024-08-08 PROCEDURE — 76536 US EXAM OF HEAD AND NECK: CPT

## 2024-08-08 PROCEDURE — 76700 US EXAM ABDOM COMPLETE: CPT

## 2024-08-09 DIAGNOSIS — N28.1 CYST OF KIDNEY, ACQUIRED: ICD-10-CM

## 2024-08-09 DIAGNOSIS — R10.9 UNSPECIFIED ABDOMINAL PAIN: ICD-10-CM

## 2024-08-30 ENCOUNTER — APPOINTMENT (OUTPATIENT)
Dept: PULMONOLOGY | Facility: CLINIC | Age: 74
End: 2024-08-30
Payer: MEDICAID

## 2024-08-30 ENCOUNTER — OUTPATIENT (OUTPATIENT)
Dept: OUTPATIENT SERVICES | Facility: HOSPITAL | Age: 74
LOS: 1 days | End: 2024-08-30
Payer: MEDICAID

## 2024-08-30 VITALS
HEART RATE: 52 BPM | TEMPERATURE: 97.1 F | OXYGEN SATURATION: 96 % | BODY MASS INDEX: 25.76 KG/M2 | WEIGHT: 140 LBS | HEIGHT: 62 IN | SYSTOLIC BLOOD PRESSURE: 117 MMHG | DIASTOLIC BLOOD PRESSURE: 82 MMHG

## 2024-08-30 DIAGNOSIS — M34.9 SYSTEMIC SCLEROSIS, UNSPECIFIED: ICD-10-CM

## 2024-08-30 DIAGNOSIS — M06.042 RHEUMATOID ARTHRITIS W/OUT RHEUMATOID FACTOR, RIGHT HAND: ICD-10-CM

## 2024-08-30 DIAGNOSIS — M06.041 RHEUMATOID ARTHRITIS W/OUT RHEUMATOID FACTOR, RIGHT HAND: ICD-10-CM

## 2024-08-30 DIAGNOSIS — Z22.7 LATENT TUBERCULOSIS: ICD-10-CM

## 2024-08-30 DIAGNOSIS — R91.8 OTHER NONSPECIFIC ABNORMAL FINDING OF LUNG FIELD: ICD-10-CM

## 2024-08-30 DIAGNOSIS — Z00.00 ENCOUNTER FOR GENERAL ADULT MEDICAL EXAMINATION WITHOUT ABNORMAL FINDINGS: ICD-10-CM

## 2024-08-30 DIAGNOSIS — J84.9 INTERSTITIAL PULMONARY DISEASE, UNSPECIFIED: ICD-10-CM

## 2024-08-30 PROCEDURE — G2211 COMPLEX E/M VISIT ADD ON: CPT | Mod: NC

## 2024-08-30 PROCEDURE — 99214 OFFICE O/P EST MOD 30 MIN: CPT

## 2024-08-30 RX ORDER — PYRIDOXINE HCL (VITAMIN B6) 50 MG
50 TABLET ORAL DAILY
Qty: 90 | Refills: 2 | Status: ACTIVE | COMMUNITY
Start: 2024-08-30 | End: 1900-01-01

## 2024-08-30 NOTE — ASSESSMENT
[FreeTextEntry1] : #Systemic sclerosis #signs of ILD on CT scan- not documented -Fine crackles at the bases on examination -CT scan 2024 no honeycombing, found multiple pulmonary nodules. small peripheral ground glass appearance at the bases -PFT ordered  #latent TB -QuantiFERON positive - Continue INZ -Start pyridoxine 50 QD -LFTs normal. no signs of neuropathy -sent 2 cmps to be done 2 months difference  #multiple nodules on CT -PET scan (05/2024): Abnormal FDG uptake co-registering with a left lower lobe 1.0 cm subpleural nodule (SUV 4.4) and left apical lung 5 mm subpleural nodule (SUV 2.2, positive on nonattenuation corrected images) -CT scan (08/2024): Bilateral stable pulmonary nodules, largest approximately 1 cm, posterior left lower lobe. Stable approximate 1 cm right paratracheal lymph node -Repeat CT scan in 6 months to f/u nodules

## 2024-08-30 NOTE — END OF VISIT
[] : Resident [FreeTextEntry3] : LLL nodule stable  Following with rheum and on treatment  Mild reticulations at the bases - PFTs pending  Mediastinal lymph nodes - stable  CT in 6 months  PFTs  Latent TB on INH and B6 - LFTs stable  6 months follow up

## 2024-08-30 NOTE — REVIEW OF SYSTEMS
[GERD] : gerd [Fever] : no fever [Fatigue] : no fatigue [Chills] : no chills [Cough] : no cough [Hemoptysis] : no hemoptysis [Chest Tightness] : no chest tightness [Dyspnea] : no dyspnea [Chest Discomfort] : no chest discomfort [Claudication] : no claudication [Edema] : no edema [Orthopnea] : no orthopnea [Palpitations] : no palpitations [Abdominal Pain] : no abdominal pain [Nausea] : no nausea [Vomiting] : no vomiting [Diarrhea] : no diarrhea [Constipation] : no constipation [Dysphagia] : no dysphagia [Nocturia] : no nocturia [Dysuria] : no dysuria [Frequency] : no frequency

## 2024-08-30 NOTE — REASON FOR VISIT
[Follow-Up] : a follow-up visit [Latent TB/ +PPD/ +IGRA] : latent TB/ +PPD/ +IGRA [Pulmonary Nodules] : pulmonary nodules [ILD] : ILD

## 2024-08-30 NOTE — HISTORY OF PRESENT ILLNESS
[TextBox_4] : This is the case of a 75 yo female with PMH of systemic sclerosis, Pulmonary nodules and latent TB presenting for follow up. Patient is still taking isoniazid and is tolerating well. However patient was not taking vit B6. Does not complain of neuropathic symptoms and no abdominal pain. LFTs normal. Patient did not perform PFT. PET scan (05/2024): Abnormal FDG uptake co-registering with a left lower lobe 1.0 cm subpleural nodule (SUV 4.4) and left apical lung 5 mm subpleural nodule (SUV 2.2, positive on nonattenuation corrected images) CT scan (08/2024): Bilateral stable pulmonary nodules, largest approximately 1 cm, posterior left lower lobe. Stable approximate 1 cm right paratracheal lymph node TTE: no signs of pulmonary HTN nonsmoker does not have dyspnea or SOB Does not report dyspnea on exertion

## 2024-08-30 NOTE — PHYSICAL EXAM
[No Acute Distress] : no acute distress [Normal Appearance] : normal appearance [No Neck Mass] : no neck mass [Normal Rate/Rhythm] : normal rate/rhythm [Normal S1, S2] : normal s1, s2 [No Murmurs] : no murmurs [No Resp Distress] : no resp distress [No Abnormalities] : no abnormalities [Benign] : benign [Normal Gait] : normal gait [No Clubbing] : no clubbing [No Cyanosis] : no cyanosis [No Edema] : no edema [No Focal Deficits] : no focal deficits [Oriented x3] : oriented x3 [Normal Affect] : normal affect [TextBox_68] : mild crackles at the bases

## 2024-09-03 DIAGNOSIS — R91.8 OTHER NONSPECIFIC ABNORMAL FINDING OF LUNG FIELD: ICD-10-CM

## 2024-09-03 DIAGNOSIS — M06.041 RHEUMATOID ARTHRITIS WITHOUT RHEUMATOID FACTOR, RIGHT HAND: ICD-10-CM

## 2024-09-03 DIAGNOSIS — M34.9 SYSTEMIC SCLEROSIS, UNSPECIFIED: ICD-10-CM

## 2024-09-03 DIAGNOSIS — Z22.7 LATENT TUBERCULOSIS: ICD-10-CM

## 2024-09-03 DIAGNOSIS — J84.9 INTERSTITIAL PULMONARY DISEASE, UNSPECIFIED: ICD-10-CM

## 2024-09-16 ENCOUNTER — OUTPATIENT (OUTPATIENT)
Dept: OUTPATIENT SERVICES | Facility: HOSPITAL | Age: 74
LOS: 1 days | End: 2024-09-16
Payer: MEDICAID

## 2024-09-16 ENCOUNTER — APPOINTMENT (OUTPATIENT)
Dept: PULMONOLOGY | Facility: HOSPITAL | Age: 74
End: 2024-09-16

## 2024-09-16 DIAGNOSIS — R06.02 SHORTNESS OF BREATH: ICD-10-CM

## 2024-09-16 PROCEDURE — 94729 DIFFUSING CAPACITY: CPT

## 2024-09-16 PROCEDURE — 94010 BREATHING CAPACITY TEST: CPT

## 2024-09-17 DIAGNOSIS — R06.02 SHORTNESS OF BREATH: ICD-10-CM

## 2024-10-03 ENCOUNTER — APPOINTMENT (OUTPATIENT)
Dept: RHEUMATOLOGY | Facility: CLINIC | Age: 74
End: 2024-10-03
Payer: MEDICAID

## 2024-10-03 VITALS
WEIGHT: 143 LBS | TEMPERATURE: 98.2 F | HEART RATE: 74 BPM | SYSTOLIC BLOOD PRESSURE: 120 MMHG | BODY MASS INDEX: 26.31 KG/M2 | DIASTOLIC BLOOD PRESSURE: 82 MMHG | HEIGHT: 62 IN

## 2024-10-03 PROCEDURE — 99214 OFFICE O/P EST MOD 30 MIN: CPT

## 2024-10-03 RX ORDER — PANTOPRAZOLE 40 MG/1
40 TABLET, DELAYED RELEASE ORAL DAILY
Qty: 90 | Refills: 1 | Status: ACTIVE | COMMUNITY
Start: 2024-10-03 | End: 1900-01-01

## 2024-10-03 RX ORDER — SILDENAFIL 20 MG/1
20 TABLET ORAL 3 TIMES DAILY
Qty: 540 | Refills: 3 | Status: ACTIVE | COMMUNITY
Start: 2024-10-03 | End: 1900-01-01

## 2024-10-03 RX ORDER — ZINC OXIDE
40 OINTMENT (GRAM) TOPICAL
Qty: 1 | Refills: 5 | Status: ACTIVE | COMMUNITY
Start: 2024-10-03 | End: 1900-01-01

## 2024-10-03 RX ORDER — LACTOBACILLUS ACIDOPHILUS/PECT 30 MG-20MG
TABLET ORAL
Refills: 0 | Status: ACTIVE | COMMUNITY

## 2024-10-03 NOTE — HISTORY OF PRESENT ILLNESS
[FreeTextEntry1] : Pt has noticed ulcerations and skin peeling on her distal fingers recently. She continues to experience pain in her shoulders and knees. She never took the gabapentin because of concern that she is taking too many medications already. + Frequent infections recently with blistering in lips? + Frequent heartburn, pt has been taking pantoprazole prn.  Previous treatments for systemic sclerosis: - Nifedipine - discontinued due to facial flushing - Amlodipine - discontinued due to facial flushing - Currently takes sildenafil - Methotrexate - was started when pt was previously diagnosed with RA, didn't help - Currently taking mycophenolate mofetil  Previous testing: - TTE in 8/2023 with borderline pulmonary hypertension PASP 29, no pericardial effusion, EF 61% - EGD - Gastritis - CT chest with pulmonary nodules. Pt saw Dr. Magen Wagner, had PET scan which demonstrated in b/l hilar and a few mediastinal lymph nodes, also had focal uptake within the thyroid gland. Pt is going for CT chest with IV contrast, PFTs  Previous HPI: From January to August 2023, pt lost 12 kg, and developed severe diffuse pain where she couldn't walk. She went to [facility] and was diagnosed with rheumatoid arthritis. She was prescribed prednisone 20 mg -> 10 mg 1.5 week taper, and was then told to discontinue the prednisone because it can affect her liver? She was then started on methotrexate 15 mg q week, but her symptoms came back on methotrexate off of prednisone. She switched away from Dr. Voss due to insurance change. + Severe gastritis. She has also noticed diffuse finger swelling and swelling in her face, and her fingers turn black with cold. Pt denies dysphagia or rashes.   Physical exam: GEN: Uncomfortable-appearing woman sitting on exam table  FACE: + Periorbital edema PULM: Clear to auscultation b/l CV: Regular rate and rhythm, no murmurs MOUTH: Reduced oral aperture, moist mucous membranes, no oral ulcers MSK: Neck: Full ROM, + pain with ROM Shoulders: Limited ROM to approx 90 degrees b/l Elbows: + Pain with ROM b/l Wrists: + Effusions with pain on ROM b/l, - Tinel Hands: + Pronounced diffuse edema in all fingers distal to MCPs with limited extension at PIPs Hips: Full ROM b/l Knees: + Pain with ROM b/l Ankles: no effusions, full ROM b/l Feet: no effusions, no TTP EXT: + Dilated nailfold capillaries, + Raynaud's on exam today with superficial erythematous ulceration on R 4th finger

## 2024-10-03 NOTE — ASSESSMENT
[FreeTextEntry1] : Systemic sclerosis: With Raynaud's, skin tightening and puffiness noted on hand exam and perioral skin tightening, dilated nailfold capillaries, GERD symptoms, also likely esophageal dysmotility given the CT chest findings mentioned above. Pt's labs came back negative for RA, for which she was recently being treated with methotrexate with no improvement in her symptoms. High +RNA hannah III, which is a/w increased risk of scleroderma renal crisis. Pt has e/o fingertip ulcerations on exam today and has had progression of her Raynaud's. - Strongly encouraged pt to wear gloves when she goes outside - Increase sildnafil to 40 mg TID - Continue mycophenolate mofetil 1000 mg BID, pt had normal CBC and CMP in 8/2024 - 8/2024 CT chest with stable pulmonary nodules, also previously had mild e/o aspiration, overall CT looks stable. Pt was unable to complete PFTs - Pt is also being treated for latent TB by pulm as her quantiferon was positive - Advised pt to take pantoprazole every day 40 mg as she has frequent heartburn  Back and knee pain: Pt's symptoms are most suggestive of degenerative changes. She says she previously had imaging of her spine and knees also demonstrating degenerative changes. Pt was unable to tolerate duloxetine due to GI side effects - Previously offered pt referral to PT but she declined as she would rather do her own exercises - Previously prescribed gabapentin but pt did not take it because of concern about side effects  f/u in 1 month

## 2024-12-05 ENCOUNTER — APPOINTMENT (OUTPATIENT)
Dept: RHEUMATOLOGY | Facility: CLINIC | Age: 74
End: 2024-12-05
Payer: MEDICAID

## 2024-12-05 VITALS
HEIGHT: 62 IN | OXYGEN SATURATION: 94 % | TEMPERATURE: 98.3 F | SYSTOLIC BLOOD PRESSURE: 128 MMHG | HEART RATE: 84 BPM | DIASTOLIC BLOOD PRESSURE: 80 MMHG | BODY MASS INDEX: 26.31 KG/M2 | WEIGHT: 143 LBS

## 2024-12-05 PROCEDURE — 99215 OFFICE O/P EST HI 40 MIN: CPT

## 2024-12-05 RX ORDER — NITROGLYCERIN 20 MG/G
2 OINTMENT TOPICAL
Qty: 1 | Refills: 3 | Status: ACTIVE | COMMUNITY
Start: 2024-12-05 | End: 1900-01-01

## 2024-12-19 RX ORDER — LISINOPRIL 5 MG/1
5 TABLET ORAL DAILY
Qty: 90 | Refills: 3 | Status: ACTIVE | COMMUNITY
Start: 2024-12-19 | End: 1900-01-01

## 2025-01-02 RX ORDER — MYCOPHENOLATE MOFETIL 500 MG/1
500 TABLET ORAL TWICE DAILY
Qty: 540 | Refills: 1 | Status: ACTIVE | COMMUNITY
Start: 2025-01-02 | End: 1900-01-01

## 2025-01-21 ENCOUNTER — APPOINTMENT (OUTPATIENT)
Dept: CARDIOLOGY | Facility: CLINIC | Age: 75
End: 2025-01-21

## 2025-02-07 ENCOUNTER — EMERGENCY (EMERGENCY)
Facility: HOSPITAL | Age: 75
LOS: 0 days | Discharge: HOME | End: 2025-02-08
Attending: EMERGENCY MEDICINE | Admitting: INTERNAL MEDICINE
Payer: MEDICAID

## 2025-02-07 VITALS
SYSTOLIC BLOOD PRESSURE: 119 MMHG | HEART RATE: 79 BPM | OXYGEN SATURATION: 97 % | DIASTOLIC BLOOD PRESSURE: 79 MMHG | TEMPERATURE: 99 F | RESPIRATION RATE: 18 BRPM

## 2025-02-07 DIAGNOSIS — R55 SYNCOPE AND COLLAPSE: ICD-10-CM

## 2025-02-07 LAB
ALBUMIN SERPL ELPH-MCNC: 4.1 G/DL — SIGNIFICANT CHANGE UP (ref 3.5–5.2)
ALP SERPL-CCNC: 103 U/L — SIGNIFICANT CHANGE UP (ref 30–115)
ALT FLD-CCNC: 7 U/L — SIGNIFICANT CHANGE UP (ref 0–41)
ANION GAP SERPL CALC-SCNC: 13 MMOL/L — SIGNIFICANT CHANGE UP (ref 7–14)
AST SERPL-CCNC: 17 U/L — SIGNIFICANT CHANGE UP (ref 0–41)
BASOPHILS # BLD AUTO: 0.02 K/UL — SIGNIFICANT CHANGE UP (ref 0–0.2)
BASOPHILS NFR BLD AUTO: 0.4 % — SIGNIFICANT CHANGE UP (ref 0–1)
BILIRUB SERPL-MCNC: 0.3 MG/DL — SIGNIFICANT CHANGE UP (ref 0.2–1.2)
BUN SERPL-MCNC: 12 MG/DL — SIGNIFICANT CHANGE UP (ref 10–20)
CALCIUM SERPL-MCNC: 8.7 MG/DL — SIGNIFICANT CHANGE UP (ref 8.4–10.5)
CHLORIDE SERPL-SCNC: 102 MMOL/L — SIGNIFICANT CHANGE UP (ref 98–110)
CO2 SERPL-SCNC: 22 MMOL/L — SIGNIFICANT CHANGE UP (ref 17–32)
CREAT SERPL-MCNC: 0.5 MG/DL — LOW (ref 0.7–1.5)
EGFR: 97 ML/MIN/1.73M2 — SIGNIFICANT CHANGE UP
EOSINOPHIL # BLD AUTO: 0.05 K/UL — SIGNIFICANT CHANGE UP (ref 0–0.7)
EOSINOPHIL NFR BLD AUTO: 0.9 % — SIGNIFICANT CHANGE UP (ref 0–8)
GLUCOSE SERPL-MCNC: 110 MG/DL — HIGH (ref 70–99)
HCT VFR BLD CALC: 33.4 % — LOW (ref 37–47)
HGB BLD-MCNC: 10.7 G/DL — LOW (ref 12–16)
IMM GRANULOCYTES NFR BLD AUTO: 0.5 % — HIGH (ref 0.1–0.3)
LYMPHOCYTES # BLD AUTO: 1.16 K/UL — LOW (ref 1.2–3.4)
LYMPHOCYTES # BLD AUTO: 20.9 % — SIGNIFICANT CHANGE UP (ref 20.5–51.1)
MCHC RBC-ENTMCNC: 28.7 PG — SIGNIFICANT CHANGE UP (ref 27–31)
MCHC RBC-ENTMCNC: 32 G/DL — SIGNIFICANT CHANGE UP (ref 32–37)
MCV RBC AUTO: 89.5 FL — SIGNIFICANT CHANGE UP (ref 81–99)
MONOCYTES # BLD AUTO: 0.48 K/UL — SIGNIFICANT CHANGE UP (ref 0.1–0.6)
MONOCYTES NFR BLD AUTO: 8.6 % — SIGNIFICANT CHANGE UP (ref 1.7–9.3)
NEUTROPHILS # BLD AUTO: 3.82 K/UL — SIGNIFICANT CHANGE UP (ref 1.4–6.5)
NEUTROPHILS NFR BLD AUTO: 68.7 % — SIGNIFICANT CHANGE UP (ref 42.2–75.2)
NRBC # BLD: 0 /100 WBCS — SIGNIFICANT CHANGE UP (ref 0–0)
NRBC BLD-RTO: 0 /100 WBCS — SIGNIFICANT CHANGE UP (ref 0–0)
NT-PROBNP SERPL-SCNC: 113 PG/ML — SIGNIFICANT CHANGE UP (ref 0–300)
PLATELET # BLD AUTO: 206 K/UL — SIGNIFICANT CHANGE UP (ref 130–400)
PMV BLD: 10.6 FL — HIGH (ref 7.4–10.4)
POTASSIUM SERPL-MCNC: 3.9 MMOL/L — SIGNIFICANT CHANGE UP (ref 3.5–5)
POTASSIUM SERPL-SCNC: 3.9 MMOL/L — SIGNIFICANT CHANGE UP (ref 3.5–5)
PROT SERPL-MCNC: 5.8 G/DL — LOW (ref 6–8)
RBC # BLD: 3.73 M/UL — LOW (ref 4.2–5.4)
RBC # FLD: 13.4 % — SIGNIFICANT CHANGE UP (ref 11.5–14.5)
SODIUM SERPL-SCNC: 137 MMOL/L — SIGNIFICANT CHANGE UP (ref 135–146)
TROPONIN T, HIGH SENSITIVITY RESULT: 12 NG/L — SIGNIFICANT CHANGE UP (ref 6–13)
TROPONIN T, HIGH SENSITIVITY RESULT: 17 NG/L — HIGH (ref 6–13)
WBC # BLD: 5.56 K/UL — SIGNIFICANT CHANGE UP (ref 4.8–10.8)
WBC # FLD AUTO: 5.56 K/UL — SIGNIFICANT CHANGE UP (ref 4.8–10.8)

## 2025-02-07 PROCEDURE — 96375 TX/PRO/DX INJ NEW DRUG ADDON: CPT | Mod: XU

## 2025-02-07 PROCEDURE — 99285 EMERGENCY DEPT VISIT HI MDM: CPT

## 2025-02-07 PROCEDURE — 36415 COLL VENOUS BLD VENIPUNCTURE: CPT

## 2025-02-07 PROCEDURE — 70486 CT MAXILLOFACIAL W/O DYE: CPT | Mod: MC

## 2025-02-07 PROCEDURE — 99285 EMERGENCY DEPT VISIT HI MDM: CPT | Mod: 25

## 2025-02-07 PROCEDURE — 73564 X-RAY EXAM KNEE 4 OR MORE: CPT | Mod: RT

## 2025-02-07 PROCEDURE — 70450 CT HEAD/BRAIN W/O DYE: CPT | Mod: MC

## 2025-02-07 PROCEDURE — 72125 CT NECK SPINE W/O DYE: CPT | Mod: MC

## 2025-02-07 PROCEDURE — 74177 CT ABD & PELVIS W/CONTRAST: CPT | Mod: MC

## 2025-02-07 PROCEDURE — 71045 X-RAY EXAM CHEST 1 VIEW: CPT

## 2025-02-07 PROCEDURE — 73130 X-RAY EXAM OF HAND: CPT | Mod: LT

## 2025-02-07 PROCEDURE — 82962 GLUCOSE BLOOD TEST: CPT

## 2025-02-07 PROCEDURE — 71260 CT THORAX DX C+: CPT | Mod: MC

## 2025-02-07 PROCEDURE — 90715 TDAP VACCINE 7 YRS/> IM: CPT

## 2025-02-07 PROCEDURE — 96372 THER/PROPH/DIAG INJ SC/IM: CPT | Mod: XU

## 2025-02-07 PROCEDURE — 83880 ASSAY OF NATRIURETIC PEPTIDE: CPT

## 2025-02-07 PROCEDURE — 85025 COMPLETE CBC W/AUTO DIFF WBC: CPT

## 2025-02-07 PROCEDURE — 83036 HEMOGLOBIN GLYCOSYLATED A1C: CPT

## 2025-02-07 PROCEDURE — 96374 THER/PROPH/DIAG INJ IV PUSH: CPT | Mod: XU

## 2025-02-07 PROCEDURE — 90471 IMMUNIZATION ADMIN: CPT

## 2025-02-07 PROCEDURE — 83735 ASSAY OF MAGNESIUM: CPT

## 2025-02-07 PROCEDURE — 84484 ASSAY OF TROPONIN QUANT: CPT

## 2025-02-07 PROCEDURE — 80053 COMPREHEN METABOLIC PANEL: CPT

## 2025-02-07 PROCEDURE — 93005 ELECTROCARDIOGRAM TRACING: CPT

## 2025-02-07 RX ORDER — MORPHINE SULFATE 60 MG/1
4 TABLET, FILM COATED, EXTENDED RELEASE ORAL ONCE
Refills: 0 | Status: DISCONTINUED | OUTPATIENT
Start: 2025-02-07 | End: 2025-02-07

## 2025-02-07 RX ORDER — AMPICILLIN SODIUM AND SULBACTAM SODIUM 2; 1 G/1; G/1
3 INJECTION, POWDER, FOR SOLUTION INTRAMUSCULAR; INTRAVENOUS ONCE
Refills: 0 | Status: COMPLETED | OUTPATIENT
Start: 2025-02-07 | End: 2025-02-07

## 2025-02-07 RX ORDER — CLOSTRIDIUM TETANI TOXOID ANTIGEN (FORMALDEHYDE INACTIVATED), CORYNEBACTERIUM DIPHTHERIAE TOXOID ANTIGEN (FORMALDEHYDE INACTIVATED), BORDETELLA PERTUSSIS TOXOID ANTIGEN (GLUTARALDEHYDE INACTIVATED), BORDETELLA PERTUSSIS FILAMENTOUS HEMAGGLUTININ ANTIGEN (FORMALDEHYDE INACTIVATED), BORDETELLA PERTUSSIS PERTACTIN ANTIGEN, AND BORDETELLA PERTUSSIS FIMBRIAE 2/3 ANTIGEN 5; 2; 2.5; 5; 3; 5 [LF]/.5ML; [LF]/.5ML; UG/.5ML; UG/.5ML; UG/.5ML; UG/.5ML
0.5 INJECTION, SUSPENSION INTRAMUSCULAR ONCE
Refills: 0 | Status: COMPLETED | OUTPATIENT
Start: 2025-02-07 | End: 2025-02-07

## 2025-02-07 RX ORDER — SODIUM CHLORIDE 9 G/ML
1000 INJECTION, SOLUTION INTRAVENOUS ONCE
Refills: 0 | Status: COMPLETED | OUTPATIENT
Start: 2025-02-07 | End: 2025-02-07

## 2025-02-07 RX ORDER — METOCLOPRAMIDE 10 MG/1
10 TABLET ORAL ONCE
Refills: 0 | Status: COMPLETED | OUTPATIENT
Start: 2025-02-07 | End: 2025-02-07

## 2025-02-07 RX ADMIN — CLOSTRIDIUM TETANI TOXOID ANTIGEN (FORMALDEHYDE INACTIVATED), CORYNEBACTERIUM DIPHTHERIAE TOXOID ANTIGEN (FORMALDEHYDE INACTIVATED), BORDETELLA PERTUSSIS TOXOID ANTIGEN (GLUTARALDEHYDE INACTIVATED), BORDETELLA PERTUSSIS FILAMENTOUS HEMAGGLUTININ ANTIGEN (FORMALDEHYDE INACTIVATED), BORDETELLA PERTUSSIS PERTACTIN ANTIGEN, AND BORDETELLA PERTUSSIS FIMBRIAE 2/3 ANTIGEN 0.5 MILLILITER(S): 5; 2; 2.5; 5; 3; 5 INJECTION, SUSPENSION INTRAMUSCULAR at 15:30

## 2025-02-07 RX ADMIN — METOCLOPRAMIDE 104 MILLIGRAM(S): 10 TABLET ORAL at 15:29

## 2025-02-07 RX ADMIN — MORPHINE SULFATE 4 MILLIGRAM(S): 60 TABLET, FILM COATED, EXTENDED RELEASE ORAL at 16:47

## 2025-02-07 RX ADMIN — AMPICILLIN SODIUM AND SULBACTAM SODIUM 200 GRAM(S): 2; 1 INJECTION, POWDER, FOR SOLUTION INTRAMUSCULAR; INTRAVENOUS at 16:47

## 2025-02-07 RX ADMIN — SODIUM CHLORIDE 1000 MILLILITER(S): 9 INJECTION, SOLUTION INTRAVENOUS at 15:29

## 2025-02-07 NOTE — ED PROVIDER NOTE - PHYSICAL EXAMINATION
Physical Exam    Vital Signs: I have reviewed the initial vital signs.  Constitutional: appears stated age, no acute distress  Eyes: Conjunctiva pink, Sclera clear  Cardiovascular: S1 and S2, regular rate, regular rhythm, well-perfused extremities, radial pulses equal and 2+, pedal pulses 2+ and equal  Respiratory: unlabored respiratory effort, clear to auscultation bilaterally no wheezing, rales and rhonchi  Gastrointestinal: soft, non-tender abdomen, no pulsatile mass, normal bowl sounds  Musculoskeletal: supple neck, no lower extremity edema, no midline tenderness  Integumentary:  abrasion to right knee and left hand, abrasion to left side of temporal region.   Neurologic: awake, alert, nvi.

## 2025-02-07 NOTE — CONSULT NOTE ADULT - ATTENDING COMMENTS
Trauma Attending Note Attestation  Patient was examined and evaluated at the bedside at 10:15 PM. Medications, radiological studies and all other relevant studies reviewed.     History as above.    Vital Signs Last 24 Hrs  T(C): 37.2 (07 Feb 2025 13:53), Max: 37.2 (07 Feb 2025 13:53)  T(F): 98.9 (07 Feb 2025 13:53), Max: 98.9 (07 Feb 2025 13:53)  HR: 70 (07 Feb 2025 21:16) (70 - 79)  BP: 116/73 (07 Feb 2025 21:16) (116/73 - 119/79)  BP(mean): --  RR: 18 (07 Feb 2025 21:16) (18 - 18)  SpO2: 97% (07 Feb 2025 21:16) (97% - 97%)    Parameters below as of 07 Feb 2025 21:16  Patient On (Oxygen Delivery Method): room air    Primary:  Airway - intact  Breathing - breath sounds bilaterally  Circulation - 2+ throughout  Disability - GCS 15, moving all extremities  Exposure - patient was exposed    I independently performed a medically appropriate exam. I have made revisions to the physical exam in the note above and agree with the exam as written.                          10.7   5.56  )-----------( 206      ( 07 Feb 2025 14:42 )             33.4     02-07    137  |  102  |  12  ----------------------------<  110[H]  3.9   |  22  |  0.5[L]    Ca 8.7; Mg x ; Phos x       ( 07 Feb 2025 14:42 )  Alb: 4.1 g/dL / Pro: 5.8 g/dL / AlkPhos: 103 U/L / ALT: 7 U/L / AST: 17 U/L / GGT:x     / Tbili 0.3 mg/dL/ Dbili x     / Indbili x        CXR reviewed and interpreted by me - no hemothorax/pneumothorax  CTH/Csp reviewed and interpreted by me - no acute fractures or intracranial hemorrhage  CT C/A/P reviewed and interpreted by me - no acute intrathoracic or intra-abdominal traumatic injuries  CT M/F reviewed and interpreted by me - L Zygomatic arch fracture, L orbital wall fracture, L maxillary sinus fracture    Assessment/Plan:  75y Female PMH DM, HLD s/p syncopal fall. Found to have L zygomatic arch fracture, L orbital wall fracture, L maxillary sinus fracture. No acute trauma surgery intervention. Recommend OMFS consult and sinus precautions. Recommend medical admission for syncope eval.    Adam Lazcano MD  Trauma/Acute Care Surgery/Surgical Critical Care Attending Trauma Attending Note Attestation  Patient was examined and evaluated at the bedside at 10:15 PM. Medications, radiological studies and all other relevant studies reviewed.     History as above.    Vital Signs Last 24 Hrs  T(C): 37.2 (07 Feb 2025 13:53), Max: 37.2 (07 Feb 2025 13:53)  T(F): 98.9 (07 Feb 2025 13:53), Max: 98.9 (07 Feb 2025 13:53)  HR: 70 (07 Feb 2025 21:16) (70 - 79)  BP: 116/73 (07 Feb 2025 21:16) (116/73 - 119/79)  BP(mean): --  RR: 18 (07 Feb 2025 21:16) (18 - 18)  SpO2: 97% (07 Feb 2025 21:16) (97% - 97%)    Parameters below as of 07 Feb 2025 21:16  Patient On (Oxygen Delivery Method): room air    Primary:  Airway - intact  Breathing - breath sounds bilaterally  Circulation - 2+ throughout  Disability - GCS 15, moving all extremities  Exposure - patient was exposed    I independently performed a medically appropriate exam. I have made revisions to the physical exam in the note above and agree with the exam as written.                          10.7   5.56  )-----------( 206      ( 07 Feb 2025 14:42 )             33.4     02-07    137  |  102  |  12  ----------------------------<  110[H]  3.9   |  22  |  0.5[L]    Ca 8.7; Mg x ; Phos x       ( 07 Feb 2025 14:42 )  Alb: 4.1 g/dL / Pro: 5.8 g/dL / AlkPhos: 103 U/L / ALT: 7 U/L / AST: 17 U/L / GGT:x     / Tbili 0.3 mg/dL/ Dbili x     / Indbili x        CXR reviewed and interpreted by me - no hemothorax/pneumothorax  CTH/Csp reviewed and interpreted by me - no acute fractures or intracranial hemorrhage  CT C/A/P reviewed and interpreted by me - no acute intrathoracic or intra-abdominal traumatic injuries  CT M/F reviewed and interpreted by me - L Zygomatic arch fracture, L orbital wall fracture, L maxillary sinus fracture  MSK XR reviewed and interpreted by me - no acute fractures    Assessment/Plan:  75y Female PMH DM, HLD s/p syncopal fall. Found to have L zygomatic arch fracture, L orbital wall fracture, L maxillary sinus fracture along with R knee abrasion and L hand abrasions. No acute trauma surgery intervention. Recommend OMFS consult and sinus precautions. Recommend medical admission for syncope eval.    Adam Lazcano MD  Trauma/Acute Care Surgery/Surgical Critical Care Attending

## 2025-02-07 NOTE — CONSULT NOTE ADULT - SUBJECTIVE AND OBJECTIVE BOX
75F s/p syncopal fall. She reports she became dizzy and then fell. She denies any changes to vision, no pain with eye movements, no pain with mouth opening or closing. Pt reports history of limited mouth opening due to scleroderma.    Exam:  Head: no scalp lacerations or hematomas  Neuro: AAOx3, GCS 15, CN V and VII intact bilaterally  Face: L brow hematoma with TTP, TTP over L zygoma, no palpable stepoffs of zygomas or mandible  Eyes: L periorbital edema, EOMI, gross vision intact, no subconjunctival hemorrhage  Nose: no septal hematoma  Oral: DEEPAK 40mm, limited lip mobility due to scleroderma, no mobility of maxilla    Imaging:  CT head reveals non-displaced L zygoma fracture, non-displaced L orbital floor fracture, and minimally displaced L maxillary sinus fractures, L brow hematoma.    A/P:  75F s/p syncopal fall sustaining non-displaced L zygoma fracture, non-displaced L orbital floor fracture, and minimally displaced L maxillary sinus fractures, L brow hematoma. No entrapment, no impingement on mandible.  - Recommend CT maxillofacial without contrast to completely visualize facial fractures and rule out other fractures.  - No surgical intervention  - Sinus precautions: do not blow nose, sneeze with mouth open  - Afrin nasal spray BID 3 days, Saline nasal spray PRN congestion
TRAUMA ACTIVATION LEVEL: CONSULT  ACTIVATED BY: ED  INTUBATED: NO      MECHANISM OF INJURY:   [] Blunt     [] MVC	  [XX] Fall	  [] Pedestrian Struck	      GCS: 15 	E: 4	V: 5	M: 6    HPI:    75yF w/ PMHx of DM and HLD seen as Trauma Consult s/p syncopal fall earlier today +HT, +LOC, -AC. She was on the concrete outside of a  when she felt her legs give out. She woke up on the sidewalk and did not know what happened. She has pain to the left side of her face. She does not complain of blurry vision and is able to move her eye in all directions without pain. She denies feeling dizzy or lightheaded before the fall. Denies chest pain, SOB, nausea, vomiting, changes in medications.  Trauma assessment in ED: ABCs intact , GCS 15 , AAOx3.    PAST MEDICAL & SURGICAL HISTORY:      Allergies    No Known Allergies  Intolerances      Home Medications:      ROS: 10-system review is otherwise negative except HPI above.      Primary Survey:    A - airway intact  B - bilateral breath sounds and good chest rise  C - palpable pulses in all extremities  D - GCS 15 on arrival, BUSBY  Exposure obtained    Vital Signs Last 24 Hrs  T(C): 37.2 (07 Feb 2025 13:53), Max: 37.2 (07 Feb 2025 13:53)  T(F): 98.9 (07 Feb 2025 13:53), Max: 98.9 (07 Feb 2025 13:53)  HR: 70 (07 Feb 2025 21:16) (70 - 79)  BP: 116/73 (07 Feb 2025 21:16) (116/73 - 119/79)  BP(mean): --  RR: 18 (07 Feb 2025 21:16) (18 - 18)  SpO2: 97% (07 Feb 2025 21:16) (97% - 97%)    Parameters below as of 07 Feb 2025 21:16  Patient On (Oxygen Delivery Method): room air        Secondary Survey:   General: NAD  HEENT: Normocephalic, left periorbital swelling and mild ecchymosis, EOMI, PEERLA. no scalp lacerations   Neck: Soft, midline trachea. no c-spine tenderness  Chest: No chest wall tenderness, no subcutaneous emphysema   Cardiac: S1, S2, RRR  Respiratory: Bilateral breath sounds, clear and equal bilaterally  Abdomen: Soft, non-distended, non-tender, no rebound, no guarding.  Groin: Normal appearing, pelvis stable   Ext:  Moving b/l upper and lower extremities. Palpable Radial b/l UE, b/l DP palpable in LE. b/l knee abrasions. swelling R>L. abrasions over knuckles on left dorsal hand   Back: No T/L/S spine tenderness, No palpable runoff/stepoff/deformity      Labs:  CAPILLARY BLOOD GLUCOSE      POCT Blood Glucose.: 120 mg/dL (07 Feb 2025 13:57)                          10.7   5.56  )-----------( 206      ( 07 Feb 2025 14:42 )             33.4       Auto Neutrophil %: 68.7 % (02-07-25 @ 14:42)  Auto Immature Granulocyte %: 0.5 % (02-07-25 @ 14:42)    02-07    137  |  102  |  12  ----------------------------<  110[H]  3.9   |  22  |  0.5[L]      Calcium: 8.7 mg/dL (02-07-25 @ 14:42)      LFTs:             5.8  | 0.3  | 17       ------------------[103     ( 07 Feb 2025 14:42 )  4.1  | x    | 7           Lipase:x      Amylase:x             Coags:      Urinalysis Basic - ( 07 Feb 2025 14:42 )    Color: x / Appearance: x / SG: x / pH: x  Gluc: 110 mg/dL / Ketone: x  / Bili: x / Urobili: x   Blood: x / Protein: x / Nitrite: x   Leuk Esterase: x / RBC: x / WBC x   Sq Epi: x / Non Sq Epi: x / Bacteria: x    RADIOLOGY & ADDITIONAL STUDIES:  < from: CT Head No Cont (02.07.25 @ 14:52) >  1. No evidence of acute intracranial pathology.    2.  Partially visualized comminuted, mildly displaced fractures of the   left orbital floor and posterolateral wall of the left maxillary sinus.    3.  Left lateral periorbital soft tissue swelling/subcutaneous hematoma.      < end of copied text >  < from: Xray Chest 1 View- PORTABLE-Urgent (02.07.25 @ 15:26) >  No radiographic evidence of acute cardiopulmonary disease.    < end of copied text >  < from: Xray Knee 4 Views, Right (02.07.25 @ 15:26) >  No acute displaced fracture.    < end of copied text >  < from: Xray Hand 3 Views, Left (02.07.25 @ 15:26) >  No acute displaced fracture.    < end of copied text >  < from: CT Maxillofacial No Cont (02.07.25 @ 21:00) >  Left zygomatic arch fracture.  Acute fractures of lateral orbital wall and lateral maxillary sinus.  Additional left posterior inferior orbital wall fracture.    < end of copied text >    ---------------------------------------------------------------------------------------

## 2025-02-07 NOTE — ED PROVIDER NOTE - EKG/XRAY ADDITIONAL INFORMATION
ED EKG: my independent interpretation - Dr. Evin Carlson : [NSR, nl axis, nl intervals, no ST elevation] ED MSK prelim, my independent interpretation - Dr. Evin Carlson: [No acute fx]

## 2025-02-07 NOTE — ED PROVIDER NOTE - CLINICAL SUMMARY MEDICAL DECISION MAKING FREE TEXT BOX
Patient evaluated for syncopal episode while leaving , has close head injury with periorbital facial swelling, evaluated by OMFS, imaging and labs reviewed.  Patient admitted for further monitoring and syncopal workup

## 2025-02-07 NOTE — ED PROVIDER NOTE - ATTENDING APP SHARED VISIT CONTRIBUTION OF CARE
75-year-old female past medical history of diabetes hyperlipidemia hypertension scleroderma presenting here after syncopal event. Patient states she was walking felt lightheaded and syncopized. Positive head trauma no LOC not on blood thinners takes 81 of aspirin was able to ambulate with assistance after fall. No chest pain back pain nausea vomiting or abdominal pain no pain with any extraocular movements  CONSTITUTIONAL: WA / WN / NAD  HEAD: + left forhead periorbital ecchymosis   EYES: PERRL; EOMI;   ENT: Normal pharynx; mucous membranes pink/moist, no erythema.  NECK: Supple;   CARD: RRR; nl S1/S2; no M/R/G.   RESP: Respiratory rate and effort are normal; breath sounds clear and equal bilaterally.  ABD: Soft, NT ND  MSK/EXT: +abrasions to left fingers and b/l knees.No midline ttp CTLS no ttp hips/thighs / tib/fib   SKIN: Warm and dry;   NEURO: AAOx3  PSYCH: Memory Intact, Normal Affect

## 2025-02-07 NOTE — CONSULT NOTE ADULT - ASSESSMENT
ASSESSMENT:  75yF w/ PMHx of DM and HLD seen as Trauma Consult s/p syncopal fall earlier today +HT, +LOC, -AC. She was on the concrete outside of a  when she felt her legs give out. She woke up on the sidewalk and did not know what happened. She has pain to the left side of her face. External signs of trauma include left periorbital edema and ecchymosis . Trauma assessment in ED: ABCs intact , GCS 15 , AAOx3,  BUSBY.     Injuries identified:   - left zygomatic arch fx  -  left lateral and posterior inferior orbital wall fx  - lateral maxillary sinus fx    PLAN:   - Trauma Labs: (CBC, BMP, Coags, T&S, UA, EtOH level)  Additional studies:  EKG  Utox    Trauma Imaging to include the following:  - CXR, Pelvic Xray  - CT Head, CT Max/Face  - Extremity films: b/l knees, left hand     Additional consultations:  - OMFS      No further workup required from trauma perspective. OMFS consulted for facial fractures. Patient should be admitted to medical service for syncopal workup. Trauma team to perform tertiary survey in 24h.   Above plan discussed with Trauma attending, Dr. Lazcano  , patient, patient family, and ED team  --------------------------------------------------------------------------------------  02-07-25 @ 22:51

## 2025-02-07 NOTE — ED ADULT NURSE NOTE - NSFALLHARMRISKINTERV_ED_ALL_ED

## 2025-02-07 NOTE — ED ADULT NURSE REASSESSMENT NOTE - NS ED NURSE REASSESS COMMENT FT1
pt is alert and awake speaking in full sentences. IV intact, vital stable, no s/s of distress.    fall precaution in place, bed to lowest position, bed alarm active, family by bed side.

## 2025-02-07 NOTE — ED PROVIDER NOTE - PROGRESS NOTE DETAILS
trauma consult and omfs consult placed Authored by Dr. Evin Carlson pending trauma consult. discussed with resident will be coming to see patient Authored by Dr. Evin knutson provided to dr. proctor pending trauma eval and reassessment Pt s/o to me by Dr. Carlson to follow up imaging, trauma consult. reassess and dispo. pt cleared by trauma.

## 2025-02-07 NOTE — ED PROVIDER NOTE - CARE PLAN
Principal Discharge DX:	Syncope  Secondary Diagnosis:	Zygoma fracture  Secondary Diagnosis:	Fracture of maxilla  Secondary Diagnosis:	Orbital fracture   1

## 2025-02-07 NOTE — ED PROVIDER NOTE - OBJECTIVE STATEMENT
75-year-old female past medical history of hypertension, on aspirin,, diabetes, hyperlipidemia, scleroderma, presents emerged part for syncopal event.  Patient states was walking felt lightheaded and passed out.  Has trauma to the left side of head, no blood thinners, admits to right knee pain, abrasions to left hand. denies neck pain, cp, sob, abd pain, nvd, numbness, tingling, dizziness.

## 2025-02-08 ENCOUNTER — TRANSCRIPTION ENCOUNTER (OUTPATIENT)
Age: 75
End: 2025-02-08

## 2025-02-08 VITALS
OXYGEN SATURATION: 97 % | TEMPERATURE: 98 F | DIASTOLIC BLOOD PRESSURE: 69 MMHG | RESPIRATION RATE: 18 BRPM | HEART RATE: 79 BPM | SYSTOLIC BLOOD PRESSURE: 115 MMHG

## 2025-02-08 LAB
A1C WITH ESTIMATED AVERAGE GLUCOSE RESULT: 5.3 % — SIGNIFICANT CHANGE UP (ref 4–5.6)
ALBUMIN SERPL ELPH-MCNC: 3.8 G/DL — SIGNIFICANT CHANGE UP (ref 3.5–5.2)
ALP SERPL-CCNC: 98 U/L — SIGNIFICANT CHANGE UP (ref 30–115)
ALT FLD-CCNC: 7 U/L — SIGNIFICANT CHANGE UP (ref 0–41)
ANION GAP SERPL CALC-SCNC: 10 MMOL/L — SIGNIFICANT CHANGE UP (ref 7–14)
AST SERPL-CCNC: 17 U/L — SIGNIFICANT CHANGE UP (ref 0–41)
BASOPHILS # BLD AUTO: 0.02 K/UL — SIGNIFICANT CHANGE UP (ref 0–0.2)
BASOPHILS NFR BLD AUTO: 0.3 % — SIGNIFICANT CHANGE UP (ref 0–1)
BILIRUB SERPL-MCNC: 0.4 MG/DL — SIGNIFICANT CHANGE UP (ref 0.2–1.2)
BUN SERPL-MCNC: 10 MG/DL — SIGNIFICANT CHANGE UP (ref 10–20)
CALCIUM SERPL-MCNC: 8.7 MG/DL — SIGNIFICANT CHANGE UP (ref 8.4–10.5)
CHLORIDE SERPL-SCNC: 102 MMOL/L — SIGNIFICANT CHANGE UP (ref 98–110)
CO2 SERPL-SCNC: 22 MMOL/L — SIGNIFICANT CHANGE UP (ref 17–32)
CREAT SERPL-MCNC: 0.5 MG/DL — LOW (ref 0.7–1.5)
EGFR: 97 ML/MIN/1.73M2 — SIGNIFICANT CHANGE UP
EOSINOPHIL # BLD AUTO: 0.06 K/UL — SIGNIFICANT CHANGE UP (ref 0–0.7)
EOSINOPHIL NFR BLD AUTO: 0.8 % — SIGNIFICANT CHANGE UP (ref 0–8)
ESTIMATED AVERAGE GLUCOSE: 105 MG/DL — SIGNIFICANT CHANGE UP (ref 68–114)
GLUCOSE BLDC GLUCOMTR-MCNC: 155 MG/DL — HIGH (ref 70–99)
GLUCOSE BLDC GLUCOMTR-MCNC: 80 MG/DL — SIGNIFICANT CHANGE UP (ref 70–99)
GLUCOSE SERPL-MCNC: 92 MG/DL — SIGNIFICANT CHANGE UP (ref 70–99)
HCT VFR BLD CALC: 32.2 % — LOW (ref 37–47)
HGB BLD-MCNC: 10.4 G/DL — LOW (ref 12–16)
IMM GRANULOCYTES NFR BLD AUTO: 0.3 % — SIGNIFICANT CHANGE UP (ref 0.1–0.3)
LYMPHOCYTES # BLD AUTO: 1.56 K/UL — SIGNIFICANT CHANGE UP (ref 1.2–3.4)
LYMPHOCYTES # BLD AUTO: 21.2 % — SIGNIFICANT CHANGE UP (ref 20.5–51.1)
MAGNESIUM SERPL-MCNC: 1.7 MG/DL — LOW (ref 1.8–2.4)
MCHC RBC-ENTMCNC: 28.5 PG — SIGNIFICANT CHANGE UP (ref 27–31)
MCHC RBC-ENTMCNC: 32.3 G/DL — SIGNIFICANT CHANGE UP (ref 32–37)
MCV RBC AUTO: 88.2 FL — SIGNIFICANT CHANGE UP (ref 81–99)
MONOCYTES # BLD AUTO: 0.81 K/UL — HIGH (ref 0.1–0.6)
MONOCYTES NFR BLD AUTO: 11 % — HIGH (ref 1.7–9.3)
NEUTROPHILS # BLD AUTO: 4.89 K/UL — SIGNIFICANT CHANGE UP (ref 1.4–6.5)
NEUTROPHILS NFR BLD AUTO: 66.4 % — SIGNIFICANT CHANGE UP (ref 42.2–75.2)
NRBC # BLD: 0 /100 WBCS — SIGNIFICANT CHANGE UP (ref 0–0)
NRBC BLD-RTO: 0 /100 WBCS — SIGNIFICANT CHANGE UP (ref 0–0)
PLATELET # BLD AUTO: 209 K/UL — SIGNIFICANT CHANGE UP (ref 130–400)
PMV BLD: 11 FL — HIGH (ref 7.4–10.4)
POTASSIUM SERPL-MCNC: 3.6 MMOL/L — SIGNIFICANT CHANGE UP (ref 3.5–5)
POTASSIUM SERPL-SCNC: 3.6 MMOL/L — SIGNIFICANT CHANGE UP (ref 3.5–5)
PROT SERPL-MCNC: 5.3 G/DL — LOW (ref 6–8)
RBC # BLD: 3.65 M/UL — LOW (ref 4.2–5.4)
RBC # FLD: 13.2 % — SIGNIFICANT CHANGE UP (ref 11.5–14.5)
SODIUM SERPL-SCNC: 134 MMOL/L — LOW (ref 135–146)
WBC # BLD: 7.36 K/UL — SIGNIFICANT CHANGE UP (ref 4.8–10.8)
WBC # FLD AUTO: 7.36 K/UL — SIGNIFICANT CHANGE UP (ref 4.8–10.8)

## 2025-02-08 RX ORDER — ISONIAZID 100 MG
1 TABLET ORAL
Qty: 0 | Refills: 0 | DISCHARGE
Start: 2025-02-08

## 2025-02-08 RX ORDER — DM/PSEUDOEPHED/ACETAMINOPHEN 10-30-250
25 CAPSULE ORAL ONCE
Refills: 0 | Status: DISCONTINUED | OUTPATIENT
Start: 2025-02-08 | End: 2025-02-08

## 2025-02-08 RX ORDER — SODIUM CHLORIDE 9 G/ML
1000 INJECTION, SOLUTION INTRAVENOUS
Refills: 0 | Status: DISCONTINUED | OUTPATIENT
Start: 2025-02-08 | End: 2025-02-08

## 2025-02-08 RX ORDER — ASPIRIN 81 MG/1
81 TABLET, COATED ORAL DAILY
Refills: 0 | Status: DISCONTINUED | OUTPATIENT
Start: 2025-02-08 | End: 2025-02-08

## 2025-02-08 RX ORDER — ACETAMINOPHEN, DIPHENHYDRAMINE HCL, PHENYLEPHRINE HCL 325; 25; 5 MG/1; MG/1; MG/1
3 TABLET ORAL AT BEDTIME
Refills: 0 | Status: DISCONTINUED | OUTPATIENT
Start: 2025-02-08 | End: 2025-02-08

## 2025-02-08 RX ORDER — SODIUM CHLORIDE 0.4 %
1 AEROSOL, SPRAY (ML) NASAL THREE TIMES A DAY
Refills: 0 | Status: DISCONTINUED | OUTPATIENT
Start: 2025-02-08 | End: 2025-02-08

## 2025-02-08 RX ORDER — ROSUVASTATIN CALCIUM 10 MG/1
10 TABLET, FILM COATED ORAL AT BEDTIME
Refills: 0 | Status: DISCONTINUED | OUTPATIENT
Start: 2025-02-08 | End: 2025-02-08

## 2025-02-08 RX ORDER — PYRIDOXINE HCL (VITAMIN B6) 25 MG
50 TABLET ORAL DAILY
Refills: 0 | Status: DISCONTINUED | OUTPATIENT
Start: 2025-02-08 | End: 2025-02-08

## 2025-02-08 RX ORDER — SODIUM CHLORIDE 0.4 %
2 AEROSOL, SPRAY (ML) NASAL
Qty: 1 | Refills: 0
Start: 2025-02-08

## 2025-02-08 RX ORDER — OXYMETAZOLINE HYDROCHLORIDE 0.05 G/100ML
1 SPRAY NASAL
Refills: 0 | Status: DISCONTINUED | OUTPATIENT
Start: 2025-02-08 | End: 2025-02-08

## 2025-02-08 RX ORDER — ISONIAZID 100 MG
300 TABLET ORAL DAILY
Refills: 0 | Status: DISCONTINUED | OUTPATIENT
Start: 2025-02-08 | End: 2025-02-08

## 2025-02-08 RX ORDER — PYRIDOXINE HCL (VITAMIN B6) 25 MG
1 TABLET ORAL
Qty: 0 | Refills: 0 | DISCHARGE
Start: 2025-02-08

## 2025-02-08 RX ORDER — CITALOPRAM HYDROBROMIDE 20 MG
20 TABLET ORAL DAILY
Refills: 0 | Status: DISCONTINUED | OUTPATIENT
Start: 2025-02-08 | End: 2025-02-08

## 2025-02-08 RX ORDER — METFORMIN HYDROCHLORIDE 1000 MG/1
500 TABLET, COATED ORAL
Refills: 0 | Status: DISCONTINUED | OUTPATIENT
Start: 2025-02-08 | End: 2025-02-08

## 2025-02-08 RX ORDER — MEMANTINE HYDROCHLORIDE 7 MG/1
10 CAPSULE, EXTENDED RELEASE ORAL
Refills: 0 | Status: DISCONTINUED | OUTPATIENT
Start: 2025-02-08 | End: 2025-02-08

## 2025-02-08 RX ORDER — DM/PSEUDOEPHED/ACETAMINOPHEN 10-30-250
12.5 CAPSULE ORAL ONCE
Refills: 0 | Status: DISCONTINUED | OUTPATIENT
Start: 2025-02-08 | End: 2025-02-08

## 2025-02-08 RX ORDER — GLUCAGON 3 MG/1
1 POWDER NASAL ONCE
Refills: 0 | Status: DISCONTINUED | OUTPATIENT
Start: 2025-02-08 | End: 2025-02-08

## 2025-02-08 RX ORDER — DONEPEZIL HYDROCHLORIDE 10 MG/1
10 TABLET, FILM COATED ORAL AT BEDTIME
Refills: 0 | Status: DISCONTINUED | OUTPATIENT
Start: 2025-02-08 | End: 2025-02-08

## 2025-02-08 RX ORDER — ACETAMINOPHEN, DIPHENHYDRAMINE HCL, PHENYLEPHRINE HCL 325; 25; 5 MG/1; MG/1; MG/1
1 TABLET ORAL
Qty: 0 | Refills: 0 | DISCHARGE
Start: 2025-02-08

## 2025-02-08 RX ORDER — OXYMETAZOLINE HYDROCHLORIDE 0.05 G/100ML
2 SPRAY NASAL
Qty: 1 | Refills: 0
Start: 2025-02-08 | End: 2025-02-14

## 2025-02-08 RX ORDER — METOPROLOL SUCCINATE 25 MG
25 TABLET, EXTENDED RELEASE 24 HR ORAL DAILY
Refills: 0 | Status: DISCONTINUED | OUTPATIENT
Start: 2025-02-08 | End: 2025-02-08

## 2025-02-08 RX ORDER — DM/PSEUDOEPHED/ACETAMINOPHEN 10-30-250
15 CAPSULE ORAL ONCE
Refills: 0 | Status: DISCONTINUED | OUTPATIENT
Start: 2025-02-08 | End: 2025-02-08

## 2025-02-08 RX ORDER — MYCOPHENOLATE MOFETIL 200 MG/ML
1500 POWDER, FOR SUSPENSION ORAL
Refills: 0 | Status: DISCONTINUED | OUTPATIENT
Start: 2025-02-08 | End: 2025-02-08

## 2025-02-08 RX ORDER — TRAZODONE HCL 100 MG
50 TABLET ORAL AT BEDTIME
Refills: 0 | Status: DISCONTINUED | OUTPATIENT
Start: 2025-02-08 | End: 2025-02-08

## 2025-02-08 RX ORDER — INSULIN LISPRO 100/ML
VIAL (ML) SUBCUTANEOUS
Refills: 0 | Status: DISCONTINUED | OUTPATIENT
Start: 2025-02-08 | End: 2025-02-08

## 2025-02-08 RX ORDER — HEPARIN SODIUM,PORCINE 10000/ML
5000 VIAL (ML) INJECTION EVERY 12 HOURS
Refills: 0 | Status: DISCONTINUED | OUTPATIENT
Start: 2025-02-08 | End: 2025-02-08

## 2025-02-08 RX ADMIN — Medication 5000 UNIT(S): at 07:14

## 2025-02-08 RX ADMIN — OXYMETAZOLINE HYDROCHLORIDE 1 SPRAY(S): 0.05 SPRAY NASAL at 07:13

## 2025-02-08 RX ADMIN — ASPIRIN 81 MILLIGRAM(S): 81 TABLET, COATED ORAL at 11:54

## 2025-02-08 RX ADMIN — Medication 1: at 11:44

## 2025-02-08 RX ADMIN — Medication 300 MILLIGRAM(S): at 11:54

## 2025-02-08 RX ADMIN — Medication 50 MILLIGRAM(S): at 11:54

## 2025-02-08 NOTE — DISCHARGE NOTE NURSING/CASE MANAGEMENT/SOCIAL WORK - PATIENT PORTAL LINK FT
You can access the FollowMyHealth Patient Portal offered by [facility] by registering at the following website: [email or web address]. By joining [facility]’s FollowMyHealth portal, you will also be able to view your health information using other applications (apps) compatible with our system.

## 2025-02-08 NOTE — DISCHARGE NOTE PROVIDER - PROVIDER TOKENS
PROVIDER:[TOKEN:[[id number]],FOLLOWUP:[2 weeks]],PROVIDER:[TOKEN:[[id number]],FOLLOWUP:[1-3 days],ESTABLISHEDPATIENT:[T]]

## 2025-02-08 NOTE — H&P ADULT - NSHPPHYSICALEXAM_GEN_ALL_CORE
T(C): 36.9 (02-08-25 @ 00:05), Max: 37.2 (02-07-25 @ 13:53)  HR: 75 (02-08-25 @ 00:05) (70 - 79)  BP: 102/61 (02-08-25 @ 00:05) (102/61 - 119/79)  RR: 18 (02-08-25 @ 00:05) (18 - 18)  SpO2: 98% (02-08-25 @ 00:05) (97% - 98%)    CONSTITUTIONAL: Well groomed, no apparent distress  RESP: No respiratory distress, no use of accessory muscles; CTA b/l  CV: RRR, +S1S2, no MRG; no JVD; no peripheral edema  GI: Soft, NT, ND, no rebound, no guarding; no palpable masses; no hepatosplenomegaly  SKIN:   NEURO:  Appropriate insight/judgment; A+O x 3      HEAD: + left forhead periorbital ecchymosis   MSK/EXT: +abrasions to left fingers and b/l knees.No midline ttp CTLS no ttp hips/thighs /  abrasion to right knee and left hand, abrasion to left side of temporal region T(C): 36.9 (02-08-25 @ 00:05), Max: 37.2 (02-07-25 @ 13:53)  HR: 75 (02-08-25 @ 00:05) (70 - 79)  BP: 102/61 (02-08-25 @ 00:05) (102/61 - 119/79)  RR: 18 (02-08-25 @ 00:05) (18 - 18)  SpO2: 98% (02-08-25 @ 00:05) (97% - 98%)    CONSTITUTIONAL: Well groomed, no apparent distress  Eyes: no vision changes, no pain with eye movements  RESP: No respiratory distress, no use of accessory muscles; CTA b/l  CV: RRR, +S1S2, no MRG; no JVD; no peripheral edema  GI: Soft, NT, ND, no rebound, no guarding; no palpable masses; no hepatosplenomegaly  SKIN: + left forehead periorbital ecchymosis,: +abrasions to left fingers and b/l knees.  NEURO:  Appropriate insight/judgment; A+O x 3

## 2025-02-08 NOTE — DISCHARGE NOTE PROVIDER - NSDCFUSCHEDAPPT_GEN_ALL_CORE_FT
Savannah Mera  [facility]  [id number]  Scheduled Appointment: 02/18/2025    Tania Pedroza  [facility]  Scheduled Appointment: 03/07/2025    [facility] Rosalio Casey  Scheduled Appointment: 03/07/2025

## 2025-02-08 NOTE — H&P ADULT - NSHPLABSRESULTS_GEN_ALL_CORE
LABS:                        10.7   5.56  )-----------( 206      ( 07 Feb 2025 14:42 )             33.4     02-07    137  |  102  |  12  ----------------------------<  110[H]  3.9   |  22  |  0.5[L]    Ca    8.7      07 Feb 2025 14:42    TPro  5.8[L]  /  Alb  4.1  /  TBili  0.3  /  DBili  x   /  AST  17  /  ALT  7   /  AlkPhos  103  02-07

## 2025-02-08 NOTE — DISCHARGE NOTE NURSING/CASE MANAGEMENT/SOCIAL WORK - FINANCIAL ASSISTANCE
[facility] provides services at a reduced cost to those who are determined to be eligible through [facility]’s financial assistance program. Information regarding [facility]’s financial assistance program can be found by going to [email or web address] or by calling [phone].

## 2025-02-08 NOTE — H&P ADULT - HISTORY OF PRESENT ILLNESS
75-year-old female PMH of HTN, DM, HLD, on aspirin, scleroderma, presents for lightheadedness and syncope with head trauma. Patient states was walking felt lightheaded and passed out. She was able to ambulate with assistance after the fall. Has trauma to the left side of head, on aspirin, admits to right knee pain, abrasions to left hand.   No chest pain back pain nausea vomiting or abdominal pain no pain with any extraocular movements    Vitals in the ED: /79, HR 79, T 98.9, RR 18, SpO2 97% on RA  EKG: NSR HR 77  Labs: WBC 3, Hgb 10.7, trop 17-12, electrolytes within normal  Imaging:   - Xray Chest (02.07.25 @ 15:26): No radiographic evidence of acute cardiopulmonary disease.  - Xray Knee, Right (02.07.25 @ 15:26): No acute displaced fracture.  - Xray Hand, Left (02.07.25 @ 15:26): No acute displaced fracture.  - CT Head No Cont (02.07.25 @ 14:52):  1. No evidence of acute intracranial pathology. 2.  Partially visualized comminuted, mildly displaced fractures of the left orbital floor and posterolateral wall of the left maxillary sinus. 3.  Left lateral periorbital soft tissue swelling/subcutaneous hematoma.  - CT Cervical Spine No Cont (02.07.25 @ 22:46): No evidence of acute fracture of the cervical spine.  - CT Maxillofacial No Cont (02.07.25 @ 21:00): Left zygomatic arch fracture. Acute fractures of lateral orbital wall and lateral maxillary sinus. Additional left posterior inferior orbital wall fracture.  - CT Chest, Abdomen and Pelvis w/ IV Cont (02.07.25 @ 22:47): No evidence of acute traumatic thoracic or abdominal pathology.    In the ED, patient received Unasyn, LR 1L bolus, Reglan and Morphine 4mg IV once.  75-year-old female PMH of HTN, DM, HLD, on aspirin, scleroderma, presents for lightheadedness and syncope with head trauma. Patient states she was visiting friends in a nursing home, was walking to the bus stop to leave, felt lightheaded and passed out. She was able to ambulate with assistance after the fall. Has trauma to the left side of head, on aspirin, admits to right knee pain, abrasions to left hand.   No chest pain back pain nausea vomiting or abdominal pain no pain with any extraocular movements    Vitals in the ED: /79, HR 79, T 98.9, RR 18, SpO2 97% on RA  EKG: NSR HR 77  Labs: WBC 3, Hgb 10.7, trop 17-12, electrolytes within normal  Imaging:   - Xray Chest (02.07.25 @ 15:26): No radiographic evidence of acute cardiopulmonary disease.  - Xray Knee, Right (02.07.25 @ 15:26): No acute displaced fracture.  - Xray Hand, Left (02.07.25 @ 15:26): No acute displaced fracture.  - CT Head No Cont (02.07.25 @ 14:52):  1. No evidence of acute intracranial pathology. 2.  Partially visualized comminuted, mildly displaced fractures of the left orbital floor and posterolateral wall of the left maxillary sinus. 3.  Left lateral periorbital soft tissue swelling/subcutaneous hematoma.  - CT Cervical Spine No Cont (02.07.25 @ 22:46): No evidence of acute fracture of the cervical spine.  - CT Maxillofacial No Cont (02.07.25 @ 21:00): Left zygomatic arch fracture. Acute fractures of lateral orbital wall and lateral maxillary sinus. Additional left posterior inferior orbital wall fracture.  - CT Chest, Abdomen and Pelvis w/ IV Cont (02.07.25 @ 22:47): No evidence of acute traumatic thoracic or abdominal pathology.    In the ED, patient received Unasyn, LR 1L bolus, Reglan and Morphine 4mg IV once.  75-year-old female PMH of HTN, DM, HLD, on aspirin, scleroderma, presents for lightheadedness and syncope with head trauma. Patient states she was visiting friends in a nursing home, was walking to the bus stop to leave, felt lightheaded and passed out. She was able to ambulate with assistance after the fall. Has trauma to the left side of head, on aspirin, admits to right knee pain, abrasions to left hand.   No chest pain back pain nausea vomiting or abdominal pain no pain with any extraocular movements  Spoke to patient with Kuwaiti  Ekatrina #[id number]    Vitals in the ED: /79, HR 79, T 98.9, RR 18, SpO2 97% on RA  EKG: NSR HR 77  Labs: WBC 3, Hgb 10.7, trop 17-12, electrolytes within normal  Imaging:   - Xray Chest (02.07.25 @ 15:26): No radiographic evidence of acute cardiopulmonary disease.  - Xray Knee, Right (02.07.25 @ 15:26): No acute displaced fracture.  - Xray Hand, Left (02.07.25 @ 15:26): No acute displaced fracture.  - CT Head No Cont (02.07.25 @ 14:52):  1. No evidence of acute intracranial pathology. 2.  Partially visualized comminuted, mildly displaced fractures of the left orbital floor and posterolateral wall of the left maxillary sinus. 3.  Left lateral periorbital soft tissue swelling/subcutaneous hematoma.  - CT Cervical Spine No Cont (02.07.25 @ 22:46): No evidence of acute fracture of the cervical spine.  - CT Maxillofacial No Cont (02.07.25 @ 21:00): Left zygomatic arch fracture. Acute fractures of lateral orbital wall and lateral maxillary sinus. Additional left posterior inferior orbital wall fracture.  - CT Chest, Abdomen and Pelvis w/ IV Cont (02.07.25 @ 22:47): No evidence of acute traumatic thoracic or abdominal pathology.    In the ED, patient received Unasyn, LR 1L bolus, Reglan and Morphine 4mg IV once.

## 2025-02-08 NOTE — DISCHARGE NOTE NURSING/CASE MANAGEMENT/SOCIAL WORK - NSDCVIVACCINE_GEN_ALL_CORE_FT
Tdap; 07-Feb-2025 15:30; Jannet Gage (RN); [employer]; [id number] (Exp. Date: 13-Aug-2026); IntraMuscular; Deltoid Right.; 0.5 milliLiter(s); VIS (VIS Published: 09-May-2013, VIS Presented: 07-Feb-2025);

## 2025-02-08 NOTE — DISCHARGE NOTE PROVIDER - NSDCCPCAREPLAN_GEN_ALL_CORE_FT
PRINCIPAL DISCHARGE DIAGNOSIS  Diagnosis: Syncope  Assessment and Plan of Treatment: you were admitted with fall and loss of consciousness, we monitored your heart and did not find any abnl rhythm,   you have a fracture of maxillary and orbital bone,   you will need to followup with your pcp and maxillofaical surgery team as outpt   if you develop any fever, n/v, confusion , visual disturbance , or any other concerning symptoms seek immediate medical care, return to ER or call 911      SECONDARY DISCHARGE DIAGNOSES  Diagnosis: Zygoma fracture  Assessment and Plan of Treatment:     Diagnosis: Fracture of maxilla  Assessment and Plan of Treatment:     Diagnosis: Orbital fracture  Assessment and Plan of Treatment:

## 2025-02-08 NOTE — H&P ADULT - ATTENDING COMMENTS
HPI:  75-year-old female PMH of HTN, DM, HLD, on aspirin, scleroderma, presents for lightheadedness and syncope with head trauma. Patient states she was visiting friends in a nursing home, was walking to the bus stop to leave, felt lightheaded and passed out. She was able to ambulate with assistance after the fall. Has trauma to the left side of head, on aspirin, admits to right knee pain, abrasions to left hand.   No chest pain back pain nausea vomiting or abdominal pain no pain with any extraocular movements  Spoke to patient with Thai  Ekatrina #[id number]    Vitals in the ED: /79, HR 79, T 98.9, RR 18, SpO2 97% on RA  EKG: NSR HR 77  Labs: WBC 3, Hgb 10.7, trop 17-12, electrolytes within normal  Imaging:   - Xray Chest (02.07.25 @ 15:26): No radiographic evidence of acute cardiopulmonary disease.  - Xray Knee, Right (02.07.25 @ 15:26): No acute displaced fracture.  - Xray Hand, Left (02.07.25 @ 15:26): No acute displaced fracture.  - CT Head No Cont (02.07.25 @ 14:52):  1. No evidence of acute intracranial pathology. 2.  Partially visualized comminuted, mildly displaced fractures of the left orbital floor and posterolateral wall of the left maxillary sinus. 3.  Left lateral periorbital soft tissue swelling/subcutaneous hematoma.  - CT Cervical Spine No Cont (02.07.25 @ 22:46): No evidence of acute fracture of the cervical spine.  - CT Maxillofacial No Cont (02.07.25 @ 21:00): Left zygomatic arch fracture. Acute fractures of lateral orbital wall and lateral maxillary sinus. Additional left posterior inferior orbital wall fracture.  - CT Chest, Abdomen and Pelvis w/ IV Cont (02.07.25 @ 22:47): No evidence of acute traumatic thoracic or abdominal pathology.    In the ED, patient received Unasyn, LR 1L bolus, Reglan and Morphine 4mg IV once.  (08 Feb 2025 00:33)  REVIEW OF SYSTEMS: see cc/HPI   CONSTITUTIONAL: No weakness, fevers or chills  EYES/ENT: No visual changes;  No vertigo or throat pain   NECK: No pain or stiffness  RESPIRATORY: No cough, wheezing, hemoptysis; No shortness of breath  CARDIOVASCULAR: No chest pain or palpitations  GASTROINTESTINAL: No abdominal or epigastric pain. No nausea, vomiting, or hematemesis; No diarrhea or constipation. No melena or hematochezia.  GENITOURINARY: No dysuria, frequency or hematuria  NEUROLOGICAL: No numbness or weakness  SKIN: No itching, rashes  ENDO: No hyperglycemia, No thyroid disorder, No dyslipidemia   HEM: No bleeding, No easy bruising, No anemia   PSYCHE: No psychosis, No mood disorder No hallucinations No delusion   MSK: No deformity, No fracture, No Joint swelling    Physical Exam:  General: WN/WD NAD  Neurology: A&Ox3, nonfocal, follows commands  Eyes: PERRLA/ EOMI  ENT/Neck: Neck supple, trachea midline, No JVD, (+) L facial swelling and tender to palpitation   Respiratory: CTA B/L, No wheezing, rales, rhonchi  CV: Normal rate regular rhythm, S1S2, no murmurs, rubs or gallops  Abdominal: Soft, NT, ND +BS,   Extremities: No edema, + peripheral pulses  Skin: No Rashes, Hematoma, Ecchymosis, facial tightening and digital tapering     A/p  Syncope     Facial bone fracture   -f/u CT face  -OFMS evaluation and follow up   -PRN pain Rx    DM type II    Dyslipidemia    Scleroderma     NOTE INCOMPLETE     PATIENT SEEN by ATTENDING 2/8/25 HPI:  75-year-old female PMH of HTN, DM, HLD, on aspirin, scleroderma, presents for lightheadedness and syncope with head trauma. Patient states she was visiting friends in a nursing home, was walking to the bus stop to leave, felt lightheaded and passed out. She was able to ambulate with assistance after the fall. Has trauma to the left side of head, on aspirin, admits to right knee pain, abrasions to left hand.   No chest pain back pain nausea vomiting or abdominal pain no pain with any extraocular movements  Spoke to patient with Slovenian  Ekatrina #[id number]    Vitals in the ED: /79, HR 79, T 98.9, RR 18, SpO2 97% on RA  EKG: NSR HR 77  Labs: WBC 3, Hgb 10.7, trop 17-12, electrolytes within normal  Imaging:   - Xray Chest (02.07.25 @ 15:26): No radiographic evidence of acute cardiopulmonary disease.  - Xray Knee, Right (02.07.25 @ 15:26): No acute displaced fracture.  - Xray Hand, Left (02.07.25 @ 15:26): No acute displaced fracture.  - CT Head No Cont (02.07.25 @ 14:52):  1. No evidence of acute intracranial pathology. 2.  Partially visualized comminuted, mildly displaced fractures of the left orbital floor and posterolateral wall of the left maxillary sinus. 3.  Left lateral periorbital soft tissue swelling/subcutaneous hematoma.  - CT Cervical Spine No Cont (02.07.25 @ 22:46): No evidence of acute fracture of the cervical spine.  - CT Maxillofacial No Cont (02.07.25 @ 21:00): Left zygomatic arch fracture. Acute fractures of lateral orbital wall and lateral maxillary sinus. Additional left posterior inferior orbital wall fracture.  - CT Chest, Abdomen and Pelvis w/ IV Cont (02.07.25 @ 22:47): No evidence of acute traumatic thoracic or abdominal pathology.    In the ED, patient received Unasyn, LR 1L bolus, Reglan and Morphine 4mg IV once.  (08 Feb 2025 00:33)  REVIEW OF SYSTEMS: see cc/HPI   CONSTITUTIONAL: No weakness, fevers or chills  EYES/ENT: No visual changes;  No vertigo or throat pain   NECK: No pain or stiffness  RESPIRATORY: No cough, wheezing, hemoptysis; No shortness of breath  CARDIOVASCULAR: No chest pain or palpitations  GASTROINTESTINAL: No abdominal or epigastric pain. No nausea, vomiting, or hematemesis; No diarrhea or constipation. No melena or hematochezia.  GENITOURINARY: No dysuria, frequency or hematuria  NEUROLOGICAL: No numbness or weakness,(+) syncope   SKIN: No itching, rashes  ENDO: No hyperglycemia, No thyroid disorder, No dyslipidemia   HEM: No bleeding, No easy bruising, No anemia   PSYCHE: No psychosis, No mood disorder No hallucinations No delusion   MSK: No deformity, No fracture, No Joint swelling    Physical Exam:  General: WN/WD NAD  Neurology: A&Ox3, nonfocal, follows commands  Eyes: PERRLA/ EOMI  ENT/Neck: Neck supple, trachea midline, No JVD, (+) L facial swelling and tender to palpitation   Respiratory: CTA B/L, No wheezing, rales, rhonchi  CV: Normal rate regular rhythm, S1S2, no murmurs, rubs or gallops  Abdominal: Soft, NT, ND +BS,   Extremities: No edema, + peripheral pulses  Skin: No Rashes, Hematoma, Ecchymosis, facial tightening and digital tapering     A/p  Syncope r/o arrhythmia   -admit to tele   -serial EKG   -check orthostatic vitals ---> if positive IV fluids   -2D echo   -PT /Rehab  -Fall precautions     Facial bone fractures   -f/u CT face  -OFMS evaluation and follow up   -Afrin and nasal saline/ avoid manipulation or blowing nose  -PRN pain Rx    LBTI   -c/w INH and B6    DM type II  -F/s monitoring and PRN ISS     Dyslipidemia  -statin     Scleroderma   -OP follow   -modified diet     DVT prophylaxis   PATIENT SEEN by ATTENDING 2/8/25

## 2025-02-08 NOTE — DISCHARGE NOTE PROVIDER - ATTENDING DISCHARGE PHYSICAL EXAMINATION:
T(F): 98.1 (02-08-25 @ 08:36), Max: 98.4 (02-08-25 @ 00:05)  HR: 79 (02-08-25 @ 08:36) (70 - 79)  BP: 115/69 (02-08-25 @ 08:36) (102/61 - 116/73)  RR: 18 (02-08-25 @ 08:36) (18 - 18)  SpO2: 97% (02-08-25 @ 08:36) (97% - 98%)  Physical exam:   constitutional NAD, AAOX3, Respiratory  lungs CTA, CVS heart RRR, GI: abdomen Soft NT, ND, BS+, skin: intact  neuro exam Motor, sensory and CN normal, no deficit , facial swelling and ecchymosis on the right cheekbone, no active bleeding , no visual disturbance

## 2025-02-08 NOTE — DISCHARGE NOTE PROVIDER - CARE PROVIDER_API CALL
Hermes Santiago  Oral/Maxillofacial Surgery  [address] [place] [id number]  Phone: [phone]  Fax: [phone]  Follow Up Time: 2 weeks    Lilliana Farris NP in [facility]  Phone: [phone]  Fax: ()-  Established Patient  Follow Up Time: 1-3 days

## 2025-02-08 NOTE — H&P ADULT - ASSESSMENT
75-year-old female PMH of HTN, DM, HLD, on aspirin, scleroderma, presents for lightheadedness and syncope with head trauma. Patient states was walking felt lightheaded and passed out. She was able to ambulate with assistance after the fall. Has trauma to the left side of head, on aspirin, admits to right knee pain, abrasions to left hand.     #Syncope, fall with head trauma  #Left zygomatic arch fracture  #Acute fractures of lateral orbital wall and lateral maxillary sinus  #Left posterior inferior orbital wall fracture.  - Vitals in the ED: /79, HR 79, T 98.9, RR 18, SpO2 97% on RA  - EKG: NSR HR 77  - Labs: WBC 3, Hgb 10.7, trop 17-12, electrolytes within normal  - No pain with any extraocular movements  - Xray Chest (02.07.25 @ 15:26): No radiographic evidence of acute cardiopulmonary disease.  - Xray Knee, Right (02.07.25 @ 15:26): No acute displaced fracture.  - Xray Hand, Left (02.07.25 @ 15:26): No acute displaced fracture.  - CT Head No Cont (02.07.25 @ 14:52):  1. No evidence of acute intracranial pathology. 2.  Partially visualized comminuted, mildly displaced fractures of the left orbital floor and posterolateral wall of the left maxillary sinus. 3.  Left lateral periorbital soft tissue swelling/subcutaneous hematoma.  - CT Cervical Spine No Cont (02.07.25 @ 22:46): No evidence of acute fracture of the cervical spine.  - CT Maxillofacial No Cont (02.07.25 @ 21:00): Left zygomatic arch fracture. Acute fractures of lateral orbital wall and lateral maxillary sinus. Additional left posterior inferior orbital wall fracture.  - CT Chest, Abdomen and Pelvis w/ IV Cont (02.07.25 @ 22:47): No evidence of acute traumatic thoracic or abdominal pathology.  - s/p Unasyn, LR 1L bolus, Reglan and Morphine 4mg IV once in the ED  - Seen by trauma sx 2/7: No acute trauma surgery intervention. Recommend OMFS consult and sinus precautions.  - Seen by OMFS in ED 2/7: No surgical intervention, Sinus precautions: do not blow nose, sneeze with mouth open,   - As per OMFS: Afrin nasal spray BID 3 days, Saline nasal spray PRN congestion    - Follow up Utox  - Check orthostats   - Monitor telemetry for events   - PT consult     #HTN    #DM  - ISS for now, target -180  - Monitor FS and adjust accordingly     #HLD    #Scleroderma           #DVT ppx:  #GI ppx: None  #Diet: Soft  #Activity: IAT  #Code:  #Dispo: Telemetry   #Medrec         75-year-old female PMH of HTN, DM, HLD, on aspirin, scleroderma, presents for lightheadedness and syncope with head trauma. Patient states was walking felt lightheaded and passed out. She was able to ambulate with assistance after the fall. Has trauma to the left side of head, on aspirin, admits to right knee pain, abrasions to left hand.     #Syncope, fall with head trauma  #Left zygomatic arch fracture  #Acute fractures of lateral orbital wall and lateral maxillary sinus  #Left posterior inferior orbital wall fracture.  - Vitals in the ED: /79, HR 79, T 98.9, RR 18, SpO2 97% on RA  - EKG: NSR HR 77  - Labs: WBC 3, Hgb 10.7, trop 17-12, electrolytes within normal  - No pain with any extraocular movements  - Xray Chest (02.07.25 @ 15:26): No radiographic evidence of acute cardiopulmonary disease.  - Xray Knee, Right (02.07.25 @ 15:26): No acute displaced fracture.  - Xray Hand, Left (02.07.25 @ 15:26): No acute displaced fracture.  - CT Head No Cont (02.07.25 @ 14:52):  1. No evidence of acute intracranial pathology. 2.  Partially visualized comminuted, mildly displaced fractures of the left orbital floor and posterolateral wall of the left maxillary sinus. 3.  Left lateral periorbital soft tissue swelling/subcutaneous hematoma.  - CT Cervical Spine No Cont (02.07.25 @ 22:46): No evidence of acute fracture of the cervical spine.  - CT Maxillofacial No Cont (02.07.25 @ 21:00): Left zygomatic arch fracture. Acute fractures of lateral orbital wall and lateral maxillary sinus. Additional left posterior inferior orbital wall fracture.  - CT Chest, Abdomen and Pelvis w/ IV Cont (02.07.25 @ 22:47): No evidence of acute traumatic thoracic or abdominal pathology.  - s/p Unasyn, LR 1L bolus, Reglan and Morphine 4mg IV once in the ED  - Seen by trauma sx 2/7: No acute trauma surgery intervention. Recommend OMFS consult and sinus precautions.  - Seen by OMFS in ED 2/7: No surgical intervention, Sinus precautions: do not blow nose, sneeze with mouth open,   - As per OMFS: Afrin nasal spray BID 3 days, Saline nasal spray PRN congestion  - Follow up OMFS  - Follow up Utox  - Check orthostats   - Monitor telemetry for events   - PT consult     #DM  - On home Metformin 500mg BID   - ISS for now, target -180  - Monitor FS and adjust accordingly     #HTN  #HLD  #Scleroderma   - Please confirm home meds in AM and complete medrec        #DVT ppx: Heparin sq  #GI ppx: None  #Diet: Soft  #Activity: IAT  #Dispo: Telemetry   #Medrec: Patient could only remember Aspirin and Metformin, states she is also on BP meds. Nothing on surescripts, called daughter Colleen x3 times, went to . Please confirm home meds in AM and complete medrec         75-year-old female PMH of HTN, DM, HLD, on aspirin, scleroderma, presents for lightheadedness and syncope with head trauma. Patient states was walking felt lightheaded and passed out. She was able to ambulate with assistance after the fall. Has trauma to the left side of head, on aspirin, admits to right knee pain, abrasions to left hand.     #Syncope, fall with head trauma  #Left zygomatic arch fracture  #Acute fractures of lateral orbital wall and lateral maxillary sinus  #Left posterior inferior orbital wall fracture.  - Vitals in the ED: /79, HR 79, T 98.9, RR 18, SpO2 97% on RA  - EKG: NSR HR 77  - Labs: WBC 3, Hgb 10.7, trop 17-12, electrolytes within normal  - No pain with any extraocular movements  - Xray Chest (02.07.25 @ 15:26): No radiographic evidence of acute cardiopulmonary disease.  - Xray Knee, Right (02.07.25 @ 15:26): No acute displaced fracture.  - Xray Hand, Left (02.07.25 @ 15:26): No acute displaced fracture.  - CT Head No Cont (02.07.25 @ 14:52):  1. No evidence of acute intracranial pathology. 2.  Partially visualized comminuted, mildly displaced fractures of the left orbital floor and posterolateral wall of the left maxillary sinus. 3.  Left lateral periorbital soft tissue swelling/subcutaneous hematoma.  - CT Cervical Spine No Cont (02.07.25 @ 22:46): No evidence of acute fracture of the cervical spine.  - CT Maxillofacial No Cont (02.07.25 @ 21:00): Left zygomatic arch fracture. Acute fractures of lateral orbital wall and lateral maxillary sinus. Additional left posterior inferior orbital wall fracture.  - CT Chest, Abdomen and Pelvis w/ IV Cont (02.07.25 @ 22:47): No evidence of acute traumatic thoracic or abdominal pathology.  - s/p Unasyn, LR 1L bolus, Reglan and Morphine 4mg IV once in the ED  - Seen by trauma sx 2/7: No acute trauma surgery intervention. Recommend OMFS consult and sinus precautions.  - Seen by OMFS in ED 2/7: No surgical intervention, Sinus precautions: do not blow nose, sneeze with mouth open,   - As per OMFS: Afrin nasal spray BID 3 days, Saline nasal spray PRN congestion  - Follow up OMFS  - Follow up Utox  - Check orthostats   - Monitor telemetry for events   - PT consult     #DM  - On home Metformin 500mg BID   - ISS for now, target -180  - Monitor FS and adjust accordingly     #HTN  #HLD  #Scleroderma   - Please confirm home meds in AM and complete medrec        #DVT ppx: Heparin sq  #GI ppx: None  #Diet: Pureed  #Activity: IAT  #Dispo: Telemetry   #Medrec: Patient could only remember Aspirin and Metformin, states she is also on BP meds. Nothing on surescripts, called daughter Colleen x3 times, went to . Please confirm home meds in AM and complete medrec

## 2025-02-08 NOTE — DISCHARGE NOTE NURSING/CASE MANAGEMENT/SOCIAL WORK - NSDCPEFALRISK_GEN_ALL_CORE
For information on Fall & Injury Prevention, visit: [email or web address] OR  [email or web address] OR  https://www.cdc.gov/steadi/patient.html

## 2025-02-08 NOTE — DISCHARGE NOTE PROVIDER - NSDCMRMEDTOKEN_GEN_ALL_CORE_FT
aspirin 81 mg oral tablet: 1 tab(s) orally once a day  metFORMIN 500 mg oral tablet: 1 tab(s) orally 2 times a day   aspirin 81 mg oral tablet: 1 tab(s) orally once a day  Caplyta 10.5 mg oral capsule: 1 cap(s) orally once a day  citalopram 20 mg oral tablet: 1 tab(s) orally once a day  donepezil 10 mg oral tablet: 1 tab(s) orally once a day  isoniazid 300 mg oral tablet: 1 tab(s) orally once a day  lisinopril 5 mg oral tablet: 1 tab(s) orally once a day  melatonin 3 mg oral tablet: 1 tab(s) orally once a day (at bedtime) As needed Insomnia  memantine 10 mg oral tablet: 1 tab(s) orally 2 times a day  metFORMIN 500 mg oral tablet: 1 tab(s) orally 2 times a day  metoprolol succinate 25 mg oral tablet, extended release: 1 tab(s) orally once a day  mycophenolate mofetil 500 mg oral tablet: 3 tab(s) orally 2 times a day  pyridoxine 50 mg oral tablet: 1 tab(s) orally once a day  rosuvastatin 10 mg oral capsule: 1 cap(s) orally once a day  sodium chloride 0.65% nasal spray: 2 nasal once a day  traZODone 50 mg oral tablet: orally once a day (at bedtime)  zolpidem 10 mg oral tablet: 1 tab(s) orally once a day (at bedtime)

## 2025-02-14 RX ORDER — METFORMIN HYDROCHLORIDE 1000 MG/1
1 TABLET, COATED ORAL
Refills: 0 | DISCHARGE

## 2025-02-14 RX ORDER — CITALOPRAM HYDROBROMIDE 20 MG
1 TABLET ORAL
Refills: 0 | DISCHARGE

## 2025-02-14 RX ORDER — ASPIRIN 81 MG/1
1 TABLET, COATED ORAL
Refills: 0 | DISCHARGE

## 2025-02-14 RX ORDER — METOPROLOL SUCCINATE 25 MG
1 TABLET, EXTENDED RELEASE 24 HR ORAL
Refills: 0 | DISCHARGE

## 2025-02-14 RX ORDER — DONEPEZIL HYDROCHLORIDE 10 MG/1
1 TABLET, FILM COATED ORAL
Refills: 0 | DISCHARGE

## 2025-02-14 RX ORDER — MYCOPHENOLATE MOFETIL 200 MG/ML
3 POWDER, FOR SUSPENSION ORAL
Refills: 0 | DISCHARGE

## 2025-02-14 RX ORDER — TRAZODONE HCL 100 MG
0 TABLET ORAL
Refills: 0 | DISCHARGE

## 2025-02-14 RX ORDER — ZOLPIDEM TARTRATE 5 MG/1
1 TABLET, COATED ORAL
Refills: 0 | DISCHARGE

## 2025-02-14 RX ORDER — ROSUVASTATIN CALCIUM 10 MG/1
1 TABLET, FILM COATED ORAL
Refills: 0 | DISCHARGE

## 2025-02-14 RX ORDER — LUMATEPERONE 10.5 MG/1
1 CAPSULE ORAL
Refills: 0 | DISCHARGE

## 2025-02-14 RX ORDER — MEMANTINE HYDROCHLORIDE 7 MG/1
1 CAPSULE, EXTENDED RELEASE ORAL
Refills: 0 | DISCHARGE

## 2025-02-18 ENCOUNTER — APPOINTMENT (OUTPATIENT)
Dept: RHEUMATOLOGY | Facility: CLINIC | Age: 75
End: 2025-02-18

## 2025-02-24 ENCOUNTER — NON-APPOINTMENT (OUTPATIENT)
Age: 75
End: 2025-02-24

## 2025-02-28 ENCOUNTER — APPOINTMENT (OUTPATIENT)
Dept: RHEUMATOLOGY | Facility: CLINIC | Age: 75
End: 2025-02-28
Payer: MEDICAID

## 2025-02-28 VITALS
HEIGHT: 62 IN | SYSTOLIC BLOOD PRESSURE: 124 MMHG | TEMPERATURE: 97.9 F | DIASTOLIC BLOOD PRESSURE: 82 MMHG | WEIGHT: 136 LBS | HEART RATE: 71 BPM | BODY MASS INDEX: 25.03 KG/M2

## 2025-02-28 PROCEDURE — 99214 OFFICE O/P EST MOD 30 MIN: CPT

## 2025-03-07 ENCOUNTER — NON-APPOINTMENT (OUTPATIENT)
Age: 75
End: 2025-03-07

## 2025-03-07 ENCOUNTER — OUTPATIENT (OUTPATIENT)
Dept: OUTPATIENT SERVICES | Facility: HOSPITAL | Age: 75
LOS: 1 days | End: 2025-03-07
Payer: MEDICAID

## 2025-03-07 ENCOUNTER — APPOINTMENT (OUTPATIENT)
Dept: PULMONOLOGY | Facility: CLINIC | Age: 75
End: 2025-03-07
Payer: MEDICAID

## 2025-03-07 VITALS
WEIGHT: 137 LBS | OXYGEN SATURATION: 93 % | HEIGHT: 62 IN | HEART RATE: 70 BPM | TEMPERATURE: 97.9 F | BODY MASS INDEX: 25.21 KG/M2

## 2025-03-07 DIAGNOSIS — Z00.00 ENCOUNTER FOR GENERAL ADULT MEDICAL EXAMINATION WITHOUT ABNORMAL FINDINGS: ICD-10-CM

## 2025-03-07 DIAGNOSIS — J84.9 INTERSTITIAL PULMONARY DISEASE, UNSPECIFIED: ICD-10-CM

## 2025-03-07 DIAGNOSIS — Z22.7 LATENT TUBERCULOSIS: ICD-10-CM

## 2025-03-07 PROCEDURE — 99213 OFFICE O/P EST LOW 20 MIN: CPT

## 2025-03-07 PROCEDURE — G2211 COMPLEX E/M VISIT ADD ON: CPT | Mod: NC

## 2025-03-13 DIAGNOSIS — J84.9 INTERSTITIAL PULMONARY DISEASE, UNSPECIFIED: ICD-10-CM

## 2025-03-13 DIAGNOSIS — Z22.7 LATENT TUBERCULOSIS: ICD-10-CM

## 2025-05-02 ENCOUNTER — APPOINTMENT (OUTPATIENT)
Dept: RHEUMATOLOGY | Facility: CLINIC | Age: 75
End: 2025-05-02

## 2025-06-02 ENCOUNTER — NON-APPOINTMENT (OUTPATIENT)
Age: 75
End: 2025-06-02

## 2025-06-16 ENCOUNTER — APPOINTMENT (OUTPATIENT)
Dept: RHEUMATOLOGY | Facility: CLINIC | Age: 75
End: 2025-06-16
Payer: MEDICAID

## 2025-06-16 VITALS
OXYGEN SATURATION: 94 % | TEMPERATURE: 98.2 F | WEIGHT: 131 LBS | HEIGHT: 62 IN | DIASTOLIC BLOOD PRESSURE: 78 MMHG | SYSTOLIC BLOOD PRESSURE: 124 MMHG | HEART RATE: 66 BPM | BODY MASS INDEX: 24.11 KG/M2

## 2025-06-16 PROBLEM — R26.89 IMBALANCE: Status: ACTIVE | Noted: 2025-06-16

## 2025-06-16 PROBLEM — R42 DIZZINESS: Status: ACTIVE | Noted: 2025-06-16

## 2025-06-16 PROCEDURE — 99214 OFFICE O/P EST MOD 30 MIN: CPT

## 2025-06-16 RX ORDER — DICLOFENAC SODIUM 3 G/100G
3 GEL TOPICAL TWICE DAILY
Qty: 1 | Refills: 11 | Status: ACTIVE | COMMUNITY
Start: 2025-06-16 | End: 1900-01-01

## 2025-06-16 RX ORDER — TRAMADOL HYDROCHLORIDE 50 MG/1
50 TABLET, COATED ORAL
Qty: 14 | Refills: 0 | Status: ACTIVE | COMMUNITY
Start: 2025-06-16 | End: 1900-01-01

## 2025-06-27 ENCOUNTER — RESULT REVIEW (OUTPATIENT)
Age: 75
End: 2025-06-27

## 2025-06-27 ENCOUNTER — OUTPATIENT (OUTPATIENT)
Dept: OUTPATIENT SERVICES | Facility: HOSPITAL | Age: 75
LOS: 1 days | End: 2025-06-27
Payer: MEDICAID

## 2025-06-27 DIAGNOSIS — R42 DIZZINESS AND GIDDINESS: ICD-10-CM

## 2025-06-27 DIAGNOSIS — Z00.8 ENCOUNTER FOR OTHER GENERAL EXAMINATION: ICD-10-CM

## 2025-06-27 LAB
ALBUMIN SERPL ELPH-MCNC: 4.3 G/DL
ALP BLD-CCNC: 127 U/L
ALT SERPL-CCNC: 8 U/L
ANION GAP SERPL CALC-SCNC: 12 MMOL/L
AST SERPL-CCNC: 14 U/L
BASOPHILS # BLD AUTO: 0.02 K/UL
BASOPHILS NFR BLD AUTO: 0.3 %
BILIRUB SERPL-MCNC: 0.3 MG/DL
BUN SERPL-MCNC: 12 MG/DL
CALCIUM SERPL-MCNC: 9.6 MG/DL
CHLORIDE SERPL-SCNC: 103 MMOL/L
CO2 SERPL-SCNC: 26 MMOL/L
CREAT SERPL-MCNC: 0.7 MG/DL
EGFRCR SERPLBLD CKD-EPI 2021: 90 ML/MIN/1.73M2
EOSINOPHIL # BLD AUTO: 0.08 K/UL
EOSINOPHIL NFR BLD AUTO: 1.2 %
GLUCOSE SERPL-MCNC: 86 MG/DL
HCT VFR BLD CALC: 36.1 %
HGB BLD-MCNC: 11.2 G/DL
IMM GRANULOCYTES NFR BLD AUTO: 0.1 %
LYMPHOCYTES # BLD AUTO: 1.5 K/UL
LYMPHOCYTES NFR BLD AUTO: 22 %
MAN DIFF?: NORMAL
MCHC RBC-ENTMCNC: 28.1 PG
MCHC RBC-ENTMCNC: 31 G/DL
MCV RBC AUTO: 90.5 FL
MONOCYTES # BLD AUTO: 0.63 K/UL
MONOCYTES NFR BLD AUTO: 9.2 %
NEUTROPHILS # BLD AUTO: 4.58 K/UL
NEUTROPHILS NFR BLD AUTO: 67.2 %
PLATELET # BLD AUTO: 248 K/UL
PMV BLD AUTO: 0 /100 WBCS
PMV BLD: 10.8 FL
POTASSIUM SERPL-SCNC: 4.6 MMOL/L
PROT SERPL-MCNC: 6.8 G/DL
RBC # BLD: 3.99 M/UL
RBC # FLD: 13.8 %
SODIUM SERPL-SCNC: 141 MMOL/L
WBC # FLD AUTO: 6.82 K/UL

## 2025-06-27 PROCEDURE — 70551 MRI BRAIN STEM W/O DYE: CPT

## 2025-06-27 PROCEDURE — 70551 MRI BRAIN STEM W/O DYE: CPT | Mod: 26

## 2025-06-28 DIAGNOSIS — R42 DIZZINESS AND GIDDINESS: ICD-10-CM

## 2025-06-28 LAB
APPEARANCE: CLEAR
BACTERIA: NEGATIVE /HPF
BILIRUBIN URINE: NEGATIVE
BLOOD URINE: NEGATIVE
CAST: 1 /LPF
COLOR: YELLOW
CREAT SPEC-SCNC: 22 MG/DL
CREAT/PROT UR: <0.2 RATIO
EPITHELIAL CELLS: 1 /HPF
GLUCOSE QUALITATIVE U: NEGATIVE MG/DL
KETONES URINE: NEGATIVE MG/DL
LEUKOCYTE ESTERASE URINE: ABNORMAL
MICROSCOPIC-UA: NORMAL
NITRITE URINE: NEGATIVE
PH URINE: 5.5
PROT UR-MCNC: <5 MG/DL
PROTEIN URINE: NEGATIVE MG/DL
RED BLOOD CELLS URINE: 0 /HPF
SPECIFIC GRAVITY URINE: 1.01
UROBILINOGEN URINE: 0.2 MG/DL
WHITE BLOOD CELLS URINE: 2 /HPF

## 2025-06-30 ENCOUNTER — NON-APPOINTMENT (OUTPATIENT)
Age: 75
End: 2025-06-30

## 2025-07-02 ENCOUNTER — APPOINTMENT (OUTPATIENT)
Dept: ORTHOPEDIC SURGERY | Facility: CLINIC | Age: 75
End: 2025-07-02

## 2025-07-02 PROCEDURE — 99204 OFFICE O/P NEW MOD 45 MIN: CPT

## 2025-07-07 PROBLEM — G56.03 BILATERAL CARPAL TUNNEL SYNDROME: Status: ACTIVE | Noted: 2025-06-16

## 2025-07-11 ENCOUNTER — NON-APPOINTMENT (OUTPATIENT)
Age: 75
End: 2025-07-11

## 2025-07-29 ENCOUNTER — APPOINTMENT (OUTPATIENT)
Dept: PAIN MANAGEMENT | Facility: CLINIC | Age: 75
End: 2025-07-29
Payer: MEDICAID

## 2025-07-29 PROCEDURE — 95911 NRV CNDJ TEST 9-10 STUDIES: CPT

## 2025-07-29 PROCEDURE — 95886 MUSC TEST DONE W/N TEST COMP: CPT

## 2025-08-06 ENCOUNTER — APPOINTMENT (OUTPATIENT)
Dept: ORTHOPEDIC SURGERY | Facility: CLINIC | Age: 75
End: 2025-08-06

## 2025-08-09 ENCOUNTER — RESULT REVIEW (OUTPATIENT)
Age: 75
End: 2025-08-09

## 2025-08-09 ENCOUNTER — OUTPATIENT (OUTPATIENT)
Dept: OUTPATIENT SERVICES | Facility: HOSPITAL | Age: 75
LOS: 1 days | End: 2025-08-09
Payer: MEDICAID

## 2025-08-09 PROCEDURE — 71250 CT THORAX DX C-: CPT

## 2025-08-09 PROCEDURE — 71250 CT THORAX DX C-: CPT | Mod: 26

## 2025-08-10 DIAGNOSIS — J84.9 INTERSTITIAL PULMONARY DISEASE, UNSPECIFIED: ICD-10-CM

## 2025-08-20 ENCOUNTER — APPOINTMENT (OUTPATIENT)
Dept: GASTROENTEROLOGY | Facility: CLINIC | Age: 75
End: 2025-08-20

## 2025-09-09 ENCOUNTER — APPOINTMENT (OUTPATIENT)
Dept: PLASTIC SURGERY | Facility: CLINIC | Age: 75
End: 2025-09-09
Payer: MEDICAID

## 2025-09-09 VITALS — BODY MASS INDEX: 23.92 KG/M2 | HEIGHT: 62 IN | WEIGHT: 130 LBS

## 2025-09-09 PROCEDURE — 99203 OFFICE O/P NEW LOW 30 MIN: CPT

## 2025-09-12 ENCOUNTER — APPOINTMENT (OUTPATIENT)
Dept: PULMONOLOGY | Facility: CLINIC | Age: 75
End: 2025-09-12
Payer: MEDICAID

## 2025-09-12 VITALS
HEIGHT: 62 IN | SYSTOLIC BLOOD PRESSURE: 134 MMHG | OXYGEN SATURATION: 95 % | HEART RATE: 80 BPM | DIASTOLIC BLOOD PRESSURE: 75 MMHG | WEIGHT: 135 LBS | BODY MASS INDEX: 24.84 KG/M2 | TEMPERATURE: 96.4 F

## 2025-09-12 DIAGNOSIS — J84.9 INTERSTITIAL PULMONARY DISEASE, UNSPECIFIED: ICD-10-CM

## 2025-09-12 DIAGNOSIS — R91.8 OTHER NONSPECIFIC ABNORMAL FINDING OF LUNG FIELD: ICD-10-CM

## 2025-09-12 DIAGNOSIS — M34.9 SYSTEMIC SCLEROSIS, UNSPECIFIED: ICD-10-CM

## 2025-09-12 PROCEDURE — 99213 OFFICE O/P EST LOW 20 MIN: CPT

## 2025-09-12 PROCEDURE — G2211 COMPLEX E/M VISIT ADD ON: CPT | Mod: NC

## 2025-09-16 ENCOUNTER — APPOINTMENT (OUTPATIENT)
Dept: ULTRASOUND IMAGING | Facility: HOSPITAL | Age: 75
End: 2025-09-16
Payer: MEDICAID

## 2025-09-16 PROCEDURE — 93930 UPPER EXTREMITY STUDY: CPT | Mod: 26

## 2025-09-18 ENCOUNTER — APPOINTMENT (OUTPATIENT)
Dept: RHEUMATOLOGY | Facility: CLINIC | Age: 75
End: 2025-09-18
Payer: MEDICAID

## 2025-09-18 VITALS
SYSTOLIC BLOOD PRESSURE: 132 MMHG | DIASTOLIC BLOOD PRESSURE: 84 MMHG | BODY MASS INDEX: 24.84 KG/M2 | OXYGEN SATURATION: 95 % | WEIGHT: 135 LBS | HEIGHT: 62 IN | TEMPERATURE: 98.1 F | HEART RATE: 74 BPM

## 2025-09-18 PROCEDURE — 99215 OFFICE O/P EST HI 40 MIN: CPT

## 2025-09-18 RX ORDER — ROSUVASTATIN CALCIUM 40 MG/1
40 TABLET, FILM COATED ORAL
Refills: 0 | Status: ACTIVE | COMMUNITY

## 2025-09-18 RX ORDER — TRAMADOL HYDROCHLORIDE 50 MG/1
50 TABLET, COATED ORAL
Qty: 14 | Refills: 0 | Status: ACTIVE | COMMUNITY
Start: 2025-09-18 | End: 1900-01-01

## 2025-09-18 RX ORDER — METOCLOPRAMIDE 5 MG/1
5 TABLET ORAL TWICE DAILY
Qty: 60 | Refills: 5 | Status: ACTIVE | COMMUNITY
Start: 2025-09-18 | End: 1900-01-01